# Patient Record
Sex: FEMALE
[De-identification: names, ages, dates, MRNs, and addresses within clinical notes are randomized per-mention and may not be internally consistent; named-entity substitution may affect disease eponyms.]

---

## 2019-07-23 PROBLEM — Z00.00 ENCOUNTER FOR PREVENTIVE HEALTH EXAMINATION: Status: ACTIVE | Noted: 2019-07-23

## 2019-08-07 ENCOUNTER — APPOINTMENT (OUTPATIENT)
Dept: PHYSICAL MEDICINE AND REHAB | Facility: CLINIC | Age: 64
End: 2019-08-07
Payer: COMMERCIAL

## 2019-08-07 VITALS
HEIGHT: 64 IN | SYSTOLIC BLOOD PRESSURE: 130 MMHG | WEIGHT: 183 LBS | BODY MASS INDEX: 31.24 KG/M2 | HEART RATE: 74 BPM | DIASTOLIC BLOOD PRESSURE: 72 MMHG

## 2019-08-07 DIAGNOSIS — Z86.73 PERSONAL HISTORY OF TRANSIENT ISCHEMIC ATTACK (TIA), AND CEREBRAL INFARCTION W/OUT RESIDUAL DEFICITS: ICD-10-CM

## 2019-08-07 DIAGNOSIS — Z87.09 PERSONAL HISTORY OF OTHER DISEASES OF THE RESPIRATORY SYSTEM: ICD-10-CM

## 2019-08-07 DIAGNOSIS — Z87.891 PERSONAL HISTORY OF NICOTINE DEPENDENCE: ICD-10-CM

## 2019-08-07 DIAGNOSIS — M65.331 TRIGGER FINGER, RIGHT MIDDLE FINGER: ICD-10-CM

## 2019-08-07 DIAGNOSIS — Z86.39 PERSONAL HISTORY OF OTHER ENDOCRINE, NUTRITIONAL AND METABOLIC DISEASE: ICD-10-CM

## 2019-08-07 DIAGNOSIS — M19.049 PRIMARY OSTEOARTHRITIS, UNSPECIFIED HAND: ICD-10-CM

## 2019-08-07 DIAGNOSIS — Z86.79 PERSONAL HISTORY OF OTHER DISEASES OF THE CIRCULATORY SYSTEM: ICD-10-CM

## 2019-08-07 DIAGNOSIS — Z87.39 PERSONAL HISTORY OF OTHER DISEASES OF THE MUSCULOSKELETAL SYSTEM AND CONNECTIVE TISSUE: ICD-10-CM

## 2019-08-07 PROCEDURE — 99244 OFF/OP CNSLTJ NEW/EST MOD 40: CPT

## 2019-08-07 RX ORDER — FOLIC ACID 1 MG/1
1 TABLET ORAL
Refills: 0 | Status: ACTIVE | COMMUNITY

## 2019-08-07 RX ORDER — UBIDECARENONE/VIT E ACET 100MG-5
CAPSULE ORAL
Refills: 0 | Status: ACTIVE | COMMUNITY

## 2019-08-07 RX ORDER — ASPIRIN ENTERIC COATED TABLETS 81 MG 81 MG/1
81 TABLET, DELAYED RELEASE ORAL
Refills: 0 | Status: ACTIVE | COMMUNITY

## 2019-08-07 RX ORDER — CYANOCOBALAMIN (VITAMIN B-12) 100 MCG
TABLET ORAL
Refills: 0 | Status: ACTIVE | COMMUNITY

## 2019-08-07 RX ORDER — ALBUTEROL SULFATE 90 UG/1
108 (90 BASE) INHALANT RESPIRATORY (INHALATION)
Refills: 0 | Status: ACTIVE | COMMUNITY

## 2019-08-07 RX ORDER — METFORMIN HYDROCHLORIDE 1000 MG/1
1000 TABLET, COATED ORAL
Refills: 0 | Status: ACTIVE | COMMUNITY

## 2019-08-07 RX ORDER — CHOLECALCIFEROL (VITAMIN D3) 125 MCG
CAPSULE ORAL
Refills: 0 | Status: ACTIVE | COMMUNITY

## 2019-08-07 NOTE — REVIEW OF SYSTEMS
[Joint Pain] : joint pain [Joint Stiffness] : joint stiffness [Negative] : Heme/Lymph [FreeTextEntry9] : r hand -she is r handed

## 2019-08-07 NOTE — PHYSICAL EXAM
[FreeTextEntry1] : PHYSICAL EXAM : OBJECTIVE \par \par GENERAL : Awake ,alert and oriented to time place and person \par HEAD : normocephalic and atraumatic \par NECK : supple ,no tracheal deviation ,no thyroid enlargement noted with swallowing\par EYES : sclera and conjunctiva normal no redness,intact extraocular movements \par ENT  : ears and nose normal in appearance -hearing adequate \par PULMONARY: effort normal. No respiratory distress. breathing regular. No wheezes \par LYMPH : No swelling in limbs, capillary return within normal range \par CVS : warm extremities,no palpitations,not short of breath, no visible jugular venous distention\par PSYCH : mood and affect normal ,good eye contact ,normal attention \par ABDOMEN : no visible distension , \par NEUROLOGICAL:cranial nerves intact,muscle tone normal,gait and balance safe except where noted below \par SKIN : warm and dry No rash detected over specific body areas examined \par MUSCULOSKELETAL: normal muscle bulk, no focal bony tenderness /posture normal except where specified below\par  R 3 rd digit + triggering \par tender at base \par R thumb enlarged MCP \par hebedens node index ++ \par grasp 5/5\par NVI

## 2019-08-07 NOTE — ASSESSMENT
[FreeTextEntry1] : \par PLAN AND RECOMMENDATIONS :\par \par We discussed differential diagnosis and clinical impression\par \par Recommend\par .symptomatic care and support\par  medications OTC \par  imaging -done \par  referral to PT \par  hydrotherapy /heat / cold for pain\par  \par  relative rest and avoidance of painful activity where possible \par  increasing ergonomic precautions  \par \par  return for follow up\par if no better can do injection \par \par explained the arthritic deformities in her hands not fixable \par she does not like the look of her hand joints \par given hand out

## 2019-08-07 NOTE — CONSULT LETTER
[FreeTextEntry1] : Dear Dr. LAI SIMMS  , \par \par I had the pleasure of evaluating your patient, KURT GARCIA .\par \par Thank you very much for allowing me to participate in the care of this patient. If you have any questions, please do not hesitate to contact me. \par \par Sincerely, \par Rafael Olsen MD \par \par ABPMR Board Certified in Physical Medicine and Rehabilitation\par Certified Fellow of AANEM (Neuromuscular and Electrodiagnostic Medicine)\par Subspecialty certified in Sports Medicine (ABPMR)\par \par  of Physical Medicine and Rehabilitation\par [facility] [employer] [facility]\par [facility]\par \par

## 2019-08-07 NOTE — HISTORY OF PRESENT ILLNESS
[FreeTextEntry1] : Ms. KURT GARCIA is a very pleasant 64 year female who comes in for evaluation of R trigger finger that has been ongoing for a few weeks  without any specific injury or inciting event. The pain is located primarily base of 3 rd finger intermittent in nature and described as "jamming " . NO  pain -just annoying -  The patient's symptoms are aggravated by typing  and alleviated by not using . The patient denies any night pain, numbness/tingling, weakness, or bowel/bladder dysfunction. 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

## 2022-08-19 ENCOUNTER — APPOINTMENT (OUTPATIENT)
Dept: NEUROLOGY | Facility: CLINIC | Age: 67
End: 2022-08-19

## 2022-08-23 PROBLEM — Z00.00 ENCOUNTER FOR PREVENTIVE HEALTH EXAMINATION: Status: ACTIVE | Noted: 2022-08-23

## 2022-08-26 ENCOUNTER — APPOINTMENT (OUTPATIENT)
Dept: NEUROLOGY | Facility: CLINIC | Age: 67
End: 2022-08-26

## 2022-08-26 VITALS
OXYGEN SATURATION: 97 % | HEIGHT: 66 IN | SYSTOLIC BLOOD PRESSURE: 109 MMHG | DIASTOLIC BLOOD PRESSURE: 73 MMHG | WEIGHT: 170 LBS | BODY MASS INDEX: 27.32 KG/M2 | HEART RATE: 86 BPM

## 2022-08-26 DIAGNOSIS — I63.9 CEREBRAL INFARCTION, UNSPECIFIED: ICD-10-CM

## 2022-08-26 PROCEDURE — 99203 OFFICE O/P NEW LOW 30 MIN: CPT

## 2025-04-28 ENCOUNTER — INPATIENT (INPATIENT)
Facility: HOSPITAL | Age: 70
LOS: 2 days | Discharge: HOME HEALTH | DRG: 176 | End: 2025-05-01
Attending: STUDENT IN AN ORGANIZED HEALTH CARE EDUCATION/TRAINING PROGRAM | Admitting: STUDENT IN AN ORGANIZED HEALTH CARE EDUCATION/TRAINING PROGRAM
Payer: MEDICARE

## 2025-04-28 VITALS
SYSTOLIC BLOOD PRESSURE: 95 MMHG | RESPIRATION RATE: 16 BRPM | TEMPERATURE: 98 F | WEIGHT: 154.1 LBS | HEART RATE: 88 BPM | DIASTOLIC BLOOD PRESSURE: 63 MMHG | OXYGEN SATURATION: 95 %

## 2025-04-28 DIAGNOSIS — I26.99 OTHER PULMONARY EMBOLISM WITHOUT ACUTE COR PULMONALE: ICD-10-CM

## 2025-04-28 LAB
ALBUMIN SERPL ELPH-MCNC: 4.5 G/DL — SIGNIFICANT CHANGE UP (ref 3.5–5.2)
ALP SERPL-CCNC: 123 U/L — HIGH (ref 30–115)
ALT FLD-CCNC: 12 U/L — SIGNIFICANT CHANGE UP (ref 0–41)
ANION GAP SERPL CALC-SCNC: 11 MMOL/L — SIGNIFICANT CHANGE UP (ref 7–14)
AST SERPL-CCNC: 35 U/L — SIGNIFICANT CHANGE UP (ref 0–41)
BASE EXCESS BLDV CALC-SCNC: -0.1 MMOL/L — SIGNIFICANT CHANGE UP (ref -2–3)
BASOPHILS # BLD AUTO: 0.04 K/UL — SIGNIFICANT CHANGE UP (ref 0–0.2)
BASOPHILS NFR BLD AUTO: 0.4 % — SIGNIFICANT CHANGE UP (ref 0–1)
BILIRUB SERPL-MCNC: 1 MG/DL — SIGNIFICANT CHANGE UP (ref 0.2–1.2)
BUN SERPL-MCNC: 20 MG/DL — SIGNIFICANT CHANGE UP (ref 10–20)
CA-I SERPL-SCNC: 1.33 MMOL/L — SIGNIFICANT CHANGE UP (ref 1.15–1.33)
CALCIUM SERPL-MCNC: 10 MG/DL — SIGNIFICANT CHANGE UP (ref 8.4–10.5)
CHLORIDE SERPL-SCNC: 102 MMOL/L — SIGNIFICANT CHANGE UP (ref 98–110)
CO2 SERPL-SCNC: 25 MMOL/L — SIGNIFICANT CHANGE UP (ref 17–32)
CREAT SERPL-MCNC: 1.1 MG/DL — SIGNIFICANT CHANGE UP (ref 0.7–1.5)
EGFR: 54 ML/MIN/1.73M2 — LOW
EGFR: 54 ML/MIN/1.73M2 — LOW
EOSINOPHIL # BLD AUTO: 0.09 K/UL — SIGNIFICANT CHANGE UP (ref 0–0.7)
EOSINOPHIL NFR BLD AUTO: 0.9 % — SIGNIFICANT CHANGE UP (ref 0–8)
GAS PNL BLDV: 134 MMOL/L — LOW (ref 136–145)
GAS PNL BLDV: SIGNIFICANT CHANGE UP
GLUCOSE SERPL-MCNC: 168 MG/DL — HIGH (ref 70–99)
HCO3 BLDV-SCNC: 27 MMOL/L — SIGNIFICANT CHANGE UP (ref 22–29)
HCT VFR BLD CALC: 34.5 % — LOW (ref 37–47)
HCT VFR BLDA CALC: 51 % — HIGH (ref 34.5–46.5)
HGB BLD CALC-MCNC: 16.9 G/DL — HIGH (ref 11.7–16.1)
HGB BLD-MCNC: 11.7 G/DL — LOW (ref 12–16)
IMM GRANULOCYTES NFR BLD AUTO: 0.5 % — HIGH (ref 0.1–0.3)
LACTATE BLDV-MCNC: 1.3 MMOL/L — SIGNIFICANT CHANGE UP (ref 0.5–2)
LIDOCAIN IGE QN: 34 U/L — SIGNIFICANT CHANGE UP (ref 7–60)
LYMPHOCYTES # BLD AUTO: 1.64 K/UL — SIGNIFICANT CHANGE UP (ref 1.2–3.4)
LYMPHOCYTES # BLD AUTO: 16 % — LOW (ref 20.5–51.1)
MAGNESIUM SERPL-MCNC: 2 MG/DL — SIGNIFICANT CHANGE UP (ref 1.8–2.4)
MCHC RBC-ENTMCNC: 33 PG — HIGH (ref 27–31)
MCHC RBC-ENTMCNC: 33.9 G/DL — SIGNIFICANT CHANGE UP (ref 32–37)
MCV RBC AUTO: 97.2 FL — SIGNIFICANT CHANGE UP (ref 81–99)
MONOCYTES # BLD AUTO: 0.49 K/UL — SIGNIFICANT CHANGE UP (ref 0.1–0.6)
MONOCYTES NFR BLD AUTO: 4.8 % — SIGNIFICANT CHANGE UP (ref 1.7–9.3)
NEUTROPHILS # BLD AUTO: 7.96 K/UL — HIGH (ref 1.4–6.5)
NEUTROPHILS NFR BLD AUTO: 77.4 % — HIGH (ref 42.2–75.2)
NRBC BLD AUTO-RTO: 0 /100 WBCS — SIGNIFICANT CHANGE UP (ref 0–0)
NT-PROBNP SERPL-SCNC: <36 PG/ML — SIGNIFICANT CHANGE UP (ref 0–300)
PCO2 BLDV: 51 MMHG — HIGH (ref 39–42)
PH BLDV: 7.33 — SIGNIFICANT CHANGE UP (ref 7.32–7.43)
PLATELET # BLD AUTO: 226 K/UL — SIGNIFICANT CHANGE UP (ref 130–400)
PMV BLD: 12.2 FL — HIGH (ref 7.4–10.4)
PO2 BLDV: 21 MMHG — LOW (ref 25–45)
POTASSIUM BLDV-SCNC: 3.7 MMOL/L — SIGNIFICANT CHANGE UP (ref 3.5–5.1)
POTASSIUM SERPL-MCNC: 5.1 MMOL/L — HIGH (ref 3.5–5)
POTASSIUM SERPL-SCNC: 5.1 MMOL/L — HIGH (ref 3.5–5)
PROT SERPL-MCNC: 8.8 G/DL — HIGH (ref 6–8)
RBC # BLD: 3.55 M/UL — LOW (ref 4.2–5.4)
RBC # FLD: 13 % — SIGNIFICANT CHANGE UP (ref 11.5–14.5)
SAO2 % BLDV: 21 % — LOW (ref 67–88)
SODIUM SERPL-SCNC: 138 MMOL/L — SIGNIFICANT CHANGE UP (ref 135–146)
TROPONIN T, HIGH SENSITIVITY RESULT: 13 NG/L — SIGNIFICANT CHANGE UP (ref 6–13)
TROPONIN T, HIGH SENSITIVITY RESULT: 15 NG/L — HIGH (ref 6–13)
WBC # BLD: 10.27 K/UL — SIGNIFICANT CHANGE UP (ref 4.8–10.8)
WBC # FLD AUTO: 10.27 K/UL — SIGNIFICANT CHANGE UP (ref 4.8–10.8)

## 2025-04-28 PROCEDURE — 74177 CT ABD & PELVIS W/CONTRAST: CPT | Mod: 26

## 2025-04-28 PROCEDURE — 71045 X-RAY EXAM CHEST 1 VIEW: CPT | Mod: 26

## 2025-04-28 PROCEDURE — 87640 STAPH A DNA AMP PROBE: CPT

## 2025-04-28 PROCEDURE — 71275 CT ANGIOGRAPHY CHEST: CPT | Mod: 26

## 2025-04-28 PROCEDURE — 97162 PT EVAL MOD COMPLEX 30 MIN: CPT | Mod: GP

## 2025-04-28 PROCEDURE — 99285 EMERGENCY DEPT VISIT HI MDM: CPT

## 2025-04-28 PROCEDURE — 83735 ASSAY OF MAGNESIUM: CPT

## 2025-04-28 PROCEDURE — 80048 BASIC METABOLIC PNL TOTAL CA: CPT

## 2025-04-28 PROCEDURE — 36415 COLL VENOUS BLD VENIPUNCTURE: CPT

## 2025-04-28 PROCEDURE — 82962 GLUCOSE BLOOD TEST: CPT

## 2025-04-28 PROCEDURE — 87641 MR-STAPH DNA AMP PROBE: CPT

## 2025-04-28 PROCEDURE — 70450 CT HEAD/BRAIN W/O DYE: CPT | Mod: 26

## 2025-04-28 PROCEDURE — 85027 COMPLETE CBC AUTOMATED: CPT

## 2025-04-28 PROCEDURE — 93970 EXTREMITY STUDY: CPT

## 2025-04-28 PROCEDURE — 83036 HEMOGLOBIN GLYCOSYLATED A1C: CPT

## 2025-04-28 PROCEDURE — 99223 1ST HOSP IP/OBS HIGH 75: CPT

## 2025-04-28 PROCEDURE — 93306 TTE W/DOPPLER COMPLETE: CPT

## 2025-04-28 RX ORDER — ENOXAPARIN SODIUM 100 MG/ML
70 INJECTION SUBCUTANEOUS ONCE
Refills: 0 | Status: COMPLETED | OUTPATIENT
Start: 2025-04-28 | End: 2025-04-28

## 2025-04-28 RX ADMIN — Medication 1000 MILLILITER(S): at 16:30

## 2025-04-28 RX ADMIN — Medication 5 MILLIGRAM(S): at 22:28

## 2025-04-28 RX ADMIN — ENOXAPARIN SODIUM 70 MILLIGRAM(S): 100 INJECTION SUBCUTANEOUS at 21:16

## 2025-04-28 NOTE — ED ADULT TRIAGE NOTE - NSWEIGHTCALCTOOLDRUG_GEN_A_CORE
Bassam from [facility] was calling to speak with a nurse for Dr Hernandez regarding if a device/device rep question for patients upcoming surgical procedure. PSR transferred called to ASHLEY Solorio.    used

## 2025-04-28 NOTE — ED ADULT NURSE NOTE - OBJECTIVE STATEMENT
pt bibems from syncopal episode while visiting family at nursing home.  in triage. Pt has hx of dementia

## 2025-04-28 NOTE — ED ADULT NURSE REASSESSMENT NOTE - NS ED NURSE REASSESS COMMENT FT1
pt is alert and awake speaking in full sentences. pt refuse to be on cardiac monitor/o2 monitor. denies chest pain/SOB, IV intact, vital stable, no s/s of distress.

## 2025-04-28 NOTE — ED PROVIDER NOTE - CLINICAL SUMMARY MEDICAL DECISION MAKING FREE TEXT BOX
70year-old female with history of CVA, IDDM, HIV, dementia, presents with daughter with concern for possible syncopal episode. States she got out of the car and was walking toward the nursing home to visit her  when she suddenly seemed to freeze while walking, when her daughter came over to her she was staring off, when asked what was wrong she said I don't know, then appeared to be stumbling a little. Then when she was upstairs sitting down visiting with her  she suddenly began shaking with one arm extended, sweating, eyes staring open, lasted for about 2 minutes. Daughter also states she has been holding her abdomen for the past few weeks, and this was never addressed. She was admitted last week for similar episode and the other hospital was focusing on her heart, including a stress test that was done and that was negative. States did not have any seizure workup. No history of seizures. Denies fall, fever, vomiting, and all other symptoms. Patient at this time denies all symptoms. On exam, afebrile, hemodynamically stable, saturating well on room air, NAD, well appearing, sitting comfortably in bed, no WOB/tachypnea, speaking full sentences, head NCAT, EOMI, anicteric, MMM, no tongue laceration, RRR, nml S1/S2, no m/r/g, lungs CTAB, no w/r/r, abd soft, mild epigastric TTP, ND, nml BS, no rebound or guarding, AAO to name and location, slightly confused, CN's 3-12 intact, motor 5/5 in all extremities, [place] spontaneously, no leg cyanosis or edema, skin warm, well perfused, no rashes or hives. Abdomen benign, with low suspicion for acute intra-abdominal process, or for mesenteric ischemia/dissection, and CT unremarkable. Character low suspicion for ACS and has had recent extensive cardiac workup. Character low suspicion for CVA and no focal or localizing findings to warrant CT angio imaging or neurology consult or stroke admission. No significant arrhythmia or e/o aortic outflow obstruction. No significant anemia or bleeding to warrant blood transfusion at this time, or gross electrolyte abnormalities to warrant repletion or admission. No fever or specific infectious symptoms. Presentation could be consistent with that of a seizure. Neurology consulted and can do EEG if stays as inpt. CT shows PE with concern for right heart strain. Clinically no signs of right heart strain. Discussed with critical care fellow admitted to the SDU. Patient well appearing, hemodynamically stable. Given Lovenox. Admitted for further monitoring, w/u, and care.

## 2025-04-28 NOTE — ED PROVIDER NOTE - CARE PLAN
1 Principal Discharge DX:	Syncope   Principal Discharge DX:	Pulmonary embolism  Secondary Diagnosis:	Syncope

## 2025-04-28 NOTE — CONSULT NOTE ADULT - ASSESSMENT
9 y/o F with a PMHx of Stroke in 2020 (residual mild expressive aphasia, and dysarthria), HLD, HIV (on Biktarvy), DM, distant hx of open heart surgery?, and recently discharged from [facility] for the workup of intermittent chest pain with EKG changes, was brought to the ED by her daughter in law after an episode transient impaired awareness. Per daughter the patient appeared to be talking while she was having the shaking episode was very diaphoretic, with no clear post ictal state, making seizure unlikely. However given her hx of HIV and stroke, seizures can't be completely ruled out. While her orthostatics are nominally negative, her BP is very soft 81/69 standing. Syncopal etiology favored this time.    Recommendations:  - CTH non-con  - Routine labs  - Can consider routine EEG if patient willing   - Would start ASA 81mg given hx of stroke.   - Please call us back if further concerns arise when initial workup completed     Discussed with attending, Dr. Gloria

## 2025-04-28 NOTE — ED ADULT NURSE NOTE - SUICIDE SCREENING QUESTION 1
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 Discussed pathology results with the patient and her mother over the phone. Will arrange for surgical oncology consultation.     Leena Brunson PA-C    No

## 2025-04-28 NOTE — ED ADULT NURSE NOTE - NSFALLHARMRISKINTERV_ED_ALL_ED
Assistance OOB with selected safe patient handling equipment if applicable/Assistance with ambulation/Communicate risk of Fall with Harm to all staff, patient, and family/Monitor gait and stability/Orthostatic vital signs/Provide visual cue: red socks, yellow wristband, yellow gown, etc/Reinforce activity limits and safety measures with patient and family/Bed in lowest position, wheels locked, appropriate side rails in place/Call bell, personal items and telephone in reach/Instruct patient to call for assistance before getting out of bed/chair/stretcher/Non-slip footwear applied when patient is off stretcher/[place] to call system/Physically safe environment - no spills, clutter or unnecessary equipment/Purposeful Proactive Rounding/Room/bathroom lighting operational, light cord in reach

## 2025-04-28 NOTE — H&P ADULT - HISTORY OF PRESENT ILLNESS
Patient is a 70 year old F with PMHx CVA (residual mild expressive aphasia, and dysarthria), HTN, HLD, Type II DM, and HIV on Biktarvy, who presented to the ED from assisted living facility after she was thought to have a syncopal episode vs. seizure as described by a family member. Denies any fevers, chills, current chest pain, shortness of breath, or palpitations.     Vitals in ED: T 97.6, HR 88, BP 85/58 (orthostatics performed, negative), RR 16, sat 95% on RA  Labs in ED: Trop 15 --> 13, Pro-BNP <36, VBG 7.33/51/21/27  Imaging:  CT Angio Chest w/ PE Protocol: Right lobar/segmental pulmonary emboli. Right heart strain is noted.       Patient is a 70 year old F with PMHx CVA (residual mild expressive aphasia, and dysarthria), HTN, HLD, Type II DM, and HIV on Biktarvy, who presented to the ED with her daughter-in-law from an assisted living facility after she was thought to have a syncopal episode vs. seizure. As per daughter in law, patient appeared to be talking when she began shaking; she was also very diaphoretic, no clear post ictal state, Denies any fevers, chills, current chest pain, shortness of breath, or palpitations.     Seen by neuro-- history obtained makes seizure unlikely. However given her hx of HIV and stroke, seizures can't be completely ruled out. Recommended CT Head non con. routine labs, EEG, and starting ASA given hx of stroke.    Vitals in ED: T 97.6, HR 88, BP 85/58 (orthostatics performed, negative), RR 16, sat 95% on RA  Labs in ED: Trop 15 --> 13, Pro-BNP <36, VBG 7.33/51/21/27  EKG:  Imaging:  CT Head: No evidence of acute intracranial abnormality. Chronic ischemic change.  CT Abd/Pelvis w/ IV contrast: There is no definite pathology within the abdomen but please note there is large amount of artifact particularly within the left lower quadrant which limits evaluation. Filling defects noted in right middle/lower lobes.  CT Angio Chest w/ PE Protocol: Right lobar/segmental pulmonary emboli. Right heart strain is noted.  Interventions:  - NS 1L bolus x1, and Lovenox 70 mg subcutaneous x1. Admitted to SDU.      Patient is a 70 year old F with PMHx CVA (residual mild expressive aphasia, and dysarthria), HTN, HLD, Type II DM, and HIV on Biktarvy, who presented to the ED with her daughter-in-law from an assisted living facility (?) after she was thought to have a syncopal episode vs. seizure. As per ED note, patient's daughter in law reported patient appeared to be talking when she began shaking; she was also very diaphoretic, no clear post ictal state. As per ED note, patient denied any fevers, chills, current chest pain, shortness of breath, or palpitations.     Seen by neuro-- history obtained makes seizure unlikely. However given her hx of HIV and stroke, seizures can't be completely ruled out. Recommended CT Head non con. routine labs, EEG, and starting ASA given hx of stroke.    OF NOTE: on my exam, patient is uncooperative. Patient's  is also being admitted with a bed in the same room, and would interject my conversation with the patient by yelling "don't tell her anything" and "she is the police." Patient would then stop talking mid-sentence and refuse to answer my questions, asking me "why I need to know." Despite explaining at length that I am a resident physician who is asking questions in regards to her medical history, she was convinced by her  not to speak further and that she "does not care" if not speaking will negatively affect her hospital admission and long term outcomes.     Vitals in ED: T 97.6, HR 88, BP 85/58 (orthostatics performed, negative), RR 16, sat 95% on RA  Labs in ED: Trop 15 --> 13, Pro-BNP <36, VBG 7.33/51/21/27  EKG:  Imaging:  CT Head: No evidence of acute intracranial abnormality. Chronic ischemic change.  CT Abd/Pelvis w/ IV contrast: There is no definite pathology within the abdomen but please note there is large amount of artifact particularly within the left lower quadrant which limits evaluation. Filling defects noted in right middle/lower lobes.  CT Angio Chest w/ PE Protocol: Right lobar/segmental pulmonary emboli. Right heart strain is noted.  Interventions:  - NS 1L bolus x1, and Lovenox 70 mg subcutaneous x1. Admitted to SDU.

## 2025-04-28 NOTE — ED ADULT NURSE NOTE - ISOLATION TYPE:
Conveyed results to patient. Answered any questions or concerns. Patient verbalized understanding.    Patient confirms taking Levothyroxine correctly.  Refill requested, enough sent to make it to next lab date.    Lab orders placed.   None

## 2025-04-28 NOTE — H&P ADULT - ASSESSMENT
**THIS NOTE IS INCOMPLETE**      #Right lobar/segmental pulmonary emboli with Right heart strain        #Syncopal Episode vs Seizure   - as per ED note, patient was thought to have a syncopal episode vs seizure  - seen by neuro --     #HTN  - was recently seen by Dr. Guadalupe at [facility] who determined losartan should be decreased to 50 mg BID (was 100 mg BID)  - was found to have hyperdynamic left ventricle, for which Dr. Guadalupe recommended metoprolol succinate 25 mg once a day in the morning     #Type II DM - uncontrolled   - as per documentation from [facility] admission, HbA1c 14  - repeat A1c  - monitor glucose, ISS    #HLD  - c/w atorvastatin 40 mg qhs    #H/O Cardiac Surgery with midline sternotomy scar   - Details unknown     #HIV   - needs to be addressed with ID.     MISC  #DVT prophylaxis: Lovenox BID  #GI prophylaxis: not indicated  #Diet: DASH, CC  #Activity: IAT  #Code status: Full Code  #Disposition: Admit to SDU   **THIS NOTE IS INCOMPLETE**    Patient is a 70 year old F with PMHx CVA (residual mild expressive aphasia, and dysarthria), HTN, HLD, Type II DM, and HIV on Biktarvy, who presented to the ED with her daughter-in-law from an assisted living facility after she was thought to have a syncopal episode vs. seizure. As per daughter in law, patient appeared to be talking when she began shaking; she was also very diaphoretic, no clear post ictal state, Denies any fevers, chills, current chest pain, shortness of breath, or palpitations. Admitted to SDU.     #Right lobar/segmental pulmonary emboli with Right heart strain  - presented after syncopal episode vs seizure  - VS on admission significant for soft BP 85/58,  orthostatics performed (-)  - Trop 15 --> 13, Pro-BNP <36  - CT Angio Chest w/ PE Protocol: Right lobar/segmental pulmonary emboli. Right heart strain is noted.  PLAN:  - s/p NS 1L bolus x1, and Lovenox 70 mg subcutaneous x1 in ED  - c/w Lovenox 70 mg BID  - LR @ 50 ccs/hr for gentle hydration  - TTE  - f/u AM trop and routine labs  - venous duplex lower extremities    #Syncopal Episode vs Seizure   - as per ED note, patient appeared to be talking when she began shaking; she was also very diaphoretic, no clear post ictal state,  - for syncope workup-- orthostatics negative, obtain TTE, PT consult, ambulate with assistance, fall precautions  - seen by neuro for possible seizure, recs as below:  --CT Head non con --> unremarkable, shows chronic ischemic change  --EEG   --starting ASA given hx of stroke    #HTN  - was recently seen by Dr. Guadalupe at [facility] who determined losartan should be decreased to 50 mg BID (was 100 mg BID)  - was found to have hyperdynamic left ventricle, for which Dr. Guadalupe recommended metoprolol succinate 25 mg once a day in the morning   - holding all BP meds for now in setting of hypotension resume as patient tolerates    #Type II DM - uncontrolled   - as per documentation from [facility] admission, HbA1c 14  - repeat A1c  - monitor glucose, ISS    #HLD  - c/w atorvastatin 40 mg qhs  - f/u lipid profile    #H/O Cardiac Surgery with midline sternotomy scar   - Details unknown     #HIV on Biktarvy  - needs to be addressed with ID    MISC  #DVT prophylaxis: Lovenox BID  #GI prophylaxis: not indicated  #Diet: DASH, CC  #Activity: IAT  #Code status: Full Code  #Disposition: Admit to SDU   **THIS NOTE IS INCOMPLETE**    Patient is a 70 year old F with PMHx CVA (residual mild expressive aphasia, and dysarthria), HTN, HLD, Type II DM, and HIV on Biktarvy, who presented to the ED with her daughter-in-law from an assisted living facility after she was thought to have a syncopal episode vs. seizure. Found to have right lobar/segmental PE with right heart strain, admitted to SDU.     #Right lobar/segmental pulmonary emboli with Right heart strain  - presented after syncopal episode vs seizure  - VS on admission significant for soft BP 85/58,  orthostatics performed (-)  - Trop 15 --> 13, Pro-BNP <36  - CT Angio Chest w/ PE Protocol: Right lobar/segmental pulmonary emboli. Right heart strain is noted.  PLAN:  - s/p NS 1L bolus x1, and Lovenox 70 mg subcutaneous x1 in ED  - c/w Lovenox 70 mg BID  - LR @ 50 ccs/hr for gentle hydration  - TTE  - f/u AM trop and routine labs  - venous duplex lower extremities    #Syncopal Episode vs Seizure   - as per ED note, patient appeared to be talking when she began shaking; she was also very diaphoretic, no clear post ictal state,  - for syncope workup-- orthostatics negative, obtain TTE, PT consult, ambulate with assistance, fall precautions  - seen by neuro for possible seizure, recs as below:  --CT Head non con --> unremarkable, shows chronic ischemic change  --EEG   --starting ASA given hx of stroke    #HTN  - was recently seen by Dr. Guadalupe at [facility] who determined losartan should be decreased to 50 mg BID (was 100 mg BID)  - was found to have hyperdynamic left ventricle, for which Dr. Guadalupe recommended metoprolol succinate 25 mg once a day in the morning   - holding all BP meds for now in setting of hypotension resume as patient tolerates    #Type II DM - uncontrolled   - as per documentation from [facility] admission, HbA1c 14  - repeat A1c  - monitor glucose, ISS    #HLD  - c/w atorvastatin 40 mg qhs  - f/u lipid profile    #H/O Cardiac Surgery with midline sternotomy scar   - Details unknown     #HIV on Biktarvy  - needs to be addressed with ID    MISC  #DVT prophylaxis: Lovenox BID  #GI prophylaxis: not indicated  #Diet: DASH, CC  #Activity: IAT  #Code status: Full Code  #Disposition: Admit to SDU   Patient is a 70 year old F with PMHx CVA (residual mild expressive aphasia, and dysarthria), HTN, HLD, Type II DM, and HIV on Biktarvy, who presented to the ED with her daughter-in-law from an assisted living facility after she was thought to have a syncopal episode vs. seizure. Found to have right lobar/segmental PE with right heart strain, admitted to SDU.     **PLEASE COMPLETE MED REC IN AM, patient refused to discuss medications or her pharmacy during admission, thank you so much**    #Right lobar/segmental pulmonary emboli with Right heart strain  - presented after syncopal episode vs seizure  - VS on admission significant for soft BP 85/58,  orthostatics performed (-)  - Trop 15 --> 13, Pro-BNP <36  - CT Angio Chest w/ PE Protocol: Right lobar/segmental pulmonary emboli. Right heart strain is noted.  PLAN:  - s/p NS 1L bolus x1, and Lovenox 70 mg subcutaneous x1 in ED  - c/w Lovenox 70 mg BID  - LR @ 50 ccs/hr for gentle hydration  - TTE  - f/u AM trop and routine labs  - venous duplex lower extremities  - Hypercoagulable workup ordered as history cannot be properly obtained at time of admission-- Protein C, S, Factor V Leiden, Homocysteine, Beta 2 glycoprotein, Anticardiolipin Ab    #Syncopal Episode vs Seizure   - as per ED note, patient appeared to be talking when she began shaking; she was also very diaphoretic, no clear post ictal state,  - for syncope workup-- orthostatics negative, obtain TTE, PT consult, ambulate with assistance, fall precautions  - seen by neuro for possible seizure, recs as below:  --CT Head non con --> unremarkable, shows chronic ischemic change  --EEG   --starting ASA given hx of stroke    #HTN  - from [facility]:  --was recently seen by Dr. Guadalupe at [facility] who determined losartan should be decreased to 50 mg BID (was 100 mg BID)  - was found to have hyperdynamic left ventricle, for which Dr. Guadalupe recommended metoprolol succinate 25 mg once a day in the morning   - holding all BP meds for now in setting of hypotension, resume as patient tolerates    #Type II DM - uncontrolled   - from [facility]:  - as per documentation from [facility] admission, HbA1c 14  - repeat A1c  - monitor glucose, ISS, increase insulin requirements as appropriate    #HLD  - from [facility]:  - c/w atorvastatin 40 mg qhs  - f/u lipid profile    #H/O Cardiac Surgery with midline sternotomy scar   - from [facility]:  - Details unknown     #HIV on Biktarvy  - unknown if patient follows with ID  - from [facility]:  - c/w Biktarvy    MISC  #DVT prophylaxis: Lovenox BID  #GI prophylaxis: not indicated  #Diet: DASH, CC  #Activity: IAT  #Code status: Full Code  #Disposition: Admit to SDU

## 2025-04-28 NOTE — ED PROVIDER NOTE - OBJECTIVE STATEMENT
pt presents to ED for eval after syncopal episode ?seizure as described by family member who witnessed it. of note, pt admitted recently for CP and was supposed to have BP meds adjusted per cardio notes, but pt has been taking meds as she was originally prescribed. family member also concerned for belly pain she had at that time that went unevaluated given more pressing issue of CP. Denies fever/chill/HA/dizziness/chest pain/palpitation/sob/n/v/d/ black stool/bloody stool/urinary sxs

## 2025-04-28 NOTE — H&P ADULT - NSHPPHYSICALEXAM_GEN_ALL_CORE
GENERAL: NAD, lying in bed comfortably  HEAD:  Atraumatic, normocephalic  NECK: Supple, no JVD  HEART: Regular rate and rhythm, no murmurs, rubs, or gallops  LUNGS: Unlabored respirations.  Clear to auscultation bilaterally, no crackles, wheezing, or rhonchi  ABDOMEN: Soft, nontender, nondistended, +BS  EXTREMITIES: 2+ peripheral pulses bilaterally. No clubbing, cyanosis, or edema  NERVOUS SYSTEM:  A&Ox3, no focal deficits   SKIN: No rashes or lesions Patient refused physical exam.

## 2025-04-28 NOTE — ED PROVIDER NOTE - ATTENDING APP SHARED VISIT CONTRIBUTION OF CARE
70year-old female with history of CVA, IDDM, HIV, dementia, presents with daughter with concern for possible syncopal episode. States she got out of the car and was walking toward the nursing home to visit her  when she suddenly seemed to freeze while walking, when her daughter came over to her she was staring off, when asked what was wrong she said I don't know, then appeared to be stumbling a little. Then when she was upstairs sitting down visiting with her  she suddenly began shaking with one arm extended, sweating, eyes staring open, lasted for about 2 minutes. Daughter also states she has been holding her abdomen for the past few weeks, and this was never addressed. She was admitted last week for similar episode and the other hospital was focusing on her heart, including a stress test that was done and that was negative. States did not have any seizure workup. No history of seizures. Denies fall, fever, vomiting, and all other symptoms. Patient at this time denies all symptoms. On exam, afebrile, hemodynamically stable, saturating well on room air, NAD, well appearing, sitting comfortably in bed, no WOB/tachypnea, speaking full sentences, head NCAT, EOMI, anicteric, MMM, no tongue laceration, RRR, nml S1/S2, no m/r/g, lungs CTAB, no w/r/r, abd soft, mild epigastric TTP, ND, nml BS, no rebound or guarding, AAO to name and location, slightly confused, CN's 3-12 intact, motor 5/5 in all extremities, MOJICA spontaneously, no leg cyanosis or edema, skin warm, well perfused, no rashes or hives. Abdomen benign, with low suspicion for acute intra-abdominal process, or for mesenteric ischemia/dissection, and CT __________. Character low suspicion for ACS and has had recent extensive cardiac workup. Character low suspicion for CVA and no focal or localizing findings to warrant CT angio imaging or neurology consult or stroke admission. No significant arrhythmia or e/o aortic outflow obstruction. No significant anemia or bleeding to warrant blood transfusion at this time, or gross electrolyte abnormalities to warrant repletion or admission. No CP/SOB to suggest ACS or e/o DVT to suggest PE to warrant admission or further work-up in this regard. No fever or specific infectious symptoms. Presentation could be consistent with that of a seizure. Neurology consulted and 70year-old female with history of CVA, IDDM, HIV, dementia, presents with daughter with concern for possible syncopal episode. States she got out of the car and was walking toward the nursing home to visit her  when she suddenly seemed to freeze while walking, when her daughter came over to her she was staring off, when asked what was wrong she said I don't know, then appeared to be stumbling a little. Then when she was upstairs sitting down visiting with her  she suddenly began shaking with one arm extended, sweating, eyes staring open, lasted for about 2 minutes. Daughter also states she has been holding her abdomen for the past few weeks, and this was never addressed. She was admitted last week for similar episode and the other hospital was focusing on her heart, including a stress test that was done and that was negative. States did not have any seizure workup. No history of seizures. Denies fall, fever, vomiting, and all other symptoms. Patient at this time denies all symptoms. On exam, afebrile, hemodynamically stable, saturating well on room air, NAD, well appearing, sitting comfortably in bed, no WOB/tachypnea, speaking full sentences, head NCAT, EOMI, anicteric, MMM, no tongue laceration, RRR, nml S1/S2, no m/r/g, lungs CTAB, no w/r/r, abd soft, mild epigastric TTP, ND, nml BS, no rebound or guarding, AAO to name and location, slightly confused, CN's 3-12 intact, motor 5/5 in all extremities, MOJICA spontaneously, no leg cyanosis or edema, skin warm, well perfused, no rashes or hives. Abdomen benign, with low suspicion for acute intra-abdominal process, or for mesenteric ischemia/dissection, and CT unremarkable. Character low suspicion for ACS and has had recent extensive cardiac workup. Character low suspicion for CVA and no focal or localizing findings to warrant CT angio imaging or neurology consult or stroke admission. No significant arrhythmia or e/o aortic outflow obstruction. No significant anemia or bleeding to warrant blood transfusion at this time, or gross electrolyte abnormalities to warrant repletion or admission. No fever or specific infectious symptoms. Presentation could be consistent with that of a seizure. Neurology consulted and can do EEG if stays as inpt. CT shows PE with concern for right heart strain. Clinically no signs of right heart strain. Discussed with critical care fellow admitted to the SDU. Patient well appearing, hemodynamically stable. Given Lovenox. Admitted for further monitoring, w/u, and care.

## 2025-04-28 NOTE — H&P ADULT - ATTENDING COMMENTS
Patient seen and examined at bedside independently of the residents. I read the resident's note and agree with the plan with the additions and corrections as noted below. My note supersedes the resident's note.     REVIEW OF SYSTEMS:  Negative except in HPI.     PMH:  CVA (residual mild expressive aphasia and dysarthria), HTN, HLD, DM II, and HIV (Biktarvy)     FHx: Reviewed. No fhx of asthma/copd, No fhx of liver and pulmonary disease. No fhx of hematological disorder.     Physical Exam:  GEN: No acute distress. Awake, Alert and oriented x 3.   Head: Atraumatic, Normocephalic.   Eye: PEERLA. No sclera icterus. EOMI.   ENT: Normal oropharynx, no thyromegaly, no mass, no lymphadenopathy.   LUNGS: Clear to auscultation bilaterally. No wheeze/rales/crackles.   HEART: Normal. S1/S2 present. RRR. No murmur/gallops.   ABD: Soft, non-tender, non-distended. Bowel sounds present.   EXT: No pitting edema. No erythema. No tenderness.  Integumentary: No rash, No sore, No petechia.   NEURO: CN III-XII intact. Strength: 5/5 b/l ULE. Sensory intact b/l ULE.     Vital Signs Last 24 Hrs  T(C): 36.4 (2025 22:21), Max: 36.4 (2025 15:20)  T(F): 97.5 (2025 22:21), Max: 97.6 (2025 15:20)  HR: 80 (2025 04:47) (80 - 88)  BP: 110/65 (2025 04:47) (95/63 - 110/65)  BP(mean): --  RR: 16 (2025 04:47) (16 - 16)  SpO2: 99% (2025 04:47) (95% - 100%)    Parameters below as of 2025 04:47  Patient On (Oxygen Delivery Method): room air      Please see the above notes for Labs and radiology.     Assessment and Plan:     69 yo F with hx of CVA (residual mild expressive aphasia and dysarthria), HTN, HLD, DM II, and HIV (Biktarvy) presents to ED for syncopal episode, found to have acute PE with possible RHS.     Acute PE with possible RHS   - CTA chest: Right lobar/segmental pulmonary emboli. Right heart strain is noted.  - Lovenox BID   - Troponin 15 -> 13. ProBNP wnl.   - check TTE and Venous duplex LE   - monitor BP closely.   - Admit to SDU.     Syncope likely 2/2 hypotension (r/o seizure)  - CTH: No evidence of acute intracranial abnormality. Chronic ischemic change.  - s/p eval by Neuro in ED.   - check EEG, TTE and carotid duplex   - seizure precaution. aspiration precaution.   - monitor on Telemetry.     HTN/HLD - hold anti-HTN medication as BP borderline in ED.   DM II - monitor FS AC HS. Insulin regimen.   HIV - Biktarvy   Hx of CVA - c/w home med.     DVT ppx: Lovenox SC  GI ppx: PPI  Diet: DASH/CC diet  Activity: as tolerated.     Date seen by the attendin2025  I have spent 75 minutes of time on the encounter which excludes teaching and/or separately reported services.

## 2025-04-29 ENCOUNTER — RESULT REVIEW (OUTPATIENT)
Age: 70
End: 2025-04-29

## 2025-04-29 DIAGNOSIS — I26.99 OTHER PULMONARY EMBOLISM WITHOUT ACUTE COR PULMONALE: ICD-10-CM

## 2025-04-29 DIAGNOSIS — R09.89 OTHER SPECIFIED SYMPTOMS AND SIGNS INVOLVING THE CIRCULATORY AND RESPIRATORY SYSTEMS: ICD-10-CM

## 2025-04-29 LAB
ANION GAP SERPL CALC-SCNC: 11 MMOL/L — SIGNIFICANT CHANGE UP (ref 7–14)
BUN SERPL-MCNC: 12 MG/DL — SIGNIFICANT CHANGE UP (ref 10–20)
CALCIUM SERPL-MCNC: 9.5 MG/DL — SIGNIFICANT CHANGE UP (ref 8.4–10.5)
CHLORIDE SERPL-SCNC: 103 MMOL/L — SIGNIFICANT CHANGE UP (ref 98–110)
CO2 SERPL-SCNC: 24 MMOL/L — SIGNIFICANT CHANGE UP (ref 17–32)
CREAT SERPL-MCNC: 0.8 MG/DL — SIGNIFICANT CHANGE UP (ref 0.7–1.5)
EGFR: 80 ML/MIN/1.73M2 — SIGNIFICANT CHANGE UP
EGFR: 80 ML/MIN/1.73M2 — SIGNIFICANT CHANGE UP
GLUCOSE BLDC GLUCOMTR-MCNC: 119 MG/DL — HIGH (ref 70–99)
GLUCOSE BLDC GLUCOMTR-MCNC: 134 MG/DL — HIGH (ref 70–99)
GLUCOSE BLDC GLUCOMTR-MCNC: 158 MG/DL — HIGH (ref 70–99)
GLUCOSE SERPL-MCNC: 147 MG/DL — HIGH (ref 70–99)
HCT VFR BLD CALC: 31.8 % — LOW (ref 37–47)
HGB BLD-MCNC: 11 G/DL — LOW (ref 12–16)
MCHC RBC-ENTMCNC: 33.1 PG — HIGH (ref 27–31)
MCHC RBC-ENTMCNC: 34.6 G/DL — SIGNIFICANT CHANGE UP (ref 32–37)
MCV RBC AUTO: 95.8 FL — SIGNIFICANT CHANGE UP (ref 81–99)
NRBC BLD AUTO-RTO: 0 /100 WBCS — SIGNIFICANT CHANGE UP (ref 0–0)
PLATELET # BLD AUTO: 237 K/UL — SIGNIFICANT CHANGE UP (ref 130–400)
PMV BLD: 10.6 FL — HIGH (ref 7.4–10.4)
POTASSIUM SERPL-MCNC: 3.4 MMOL/L — LOW (ref 3.5–5)
POTASSIUM SERPL-SCNC: 3.4 MMOL/L — LOW (ref 3.5–5)
RBC # BLD: 3.32 M/UL — LOW (ref 4.2–5.4)
RBC # FLD: 12.7 % — SIGNIFICANT CHANGE UP (ref 11.5–14.5)
SODIUM SERPL-SCNC: 138 MMOL/L — SIGNIFICANT CHANGE UP (ref 135–146)
WBC # BLD: 5.97 K/UL — SIGNIFICANT CHANGE UP (ref 4.8–10.8)
WBC # FLD AUTO: 5.97 K/UL — SIGNIFICANT CHANGE UP (ref 4.8–10.8)

## 2025-04-29 PROCEDURE — 93970 EXTREMITY STUDY: CPT | Mod: 26

## 2025-04-29 PROCEDURE — 99233 SBSQ HOSP IP/OBS HIGH 50: CPT

## 2025-04-29 PROCEDURE — 93306 TTE W/DOPPLER COMPLETE: CPT | Mod: 26

## 2025-04-29 RX ORDER — LOSARTAN POTASSIUM 100 MG/1
1 TABLET, FILM COATED ORAL
Refills: 0 | DISCHARGE

## 2025-04-29 RX ORDER — ATORVASTATIN CALCIUM 80 MG/1
1 TABLET, FILM COATED ORAL
Refills: 0 | DISCHARGE

## 2025-04-29 RX ORDER — METOPROLOL SUCCINATE 50 MG/1
1 TABLET, EXTENDED RELEASE ORAL
Refills: 0 | DISCHARGE

## 2025-04-29 RX ORDER — INSULIN GLARGINE-YFGN 100 [IU]/ML
16 INJECTION, SOLUTION SUBCUTANEOUS AT BEDTIME
Refills: 0 | Status: DISCONTINUED | OUTPATIENT
Start: 2025-04-29 | End: 2025-05-01

## 2025-04-29 RX ORDER — BICTEGRAVIR SODIUM, EMTRICITABINE, AND TENOFOVIR ALAFENAMIDE FUMARATE 50; 200; 25 MG/1; MG/1; MG/1
1 TABLET ORAL DAILY
Refills: 0 | Status: DISCONTINUED | OUTPATIENT
Start: 2025-04-29 | End: 2025-05-01

## 2025-04-29 RX ORDER — DEXTROSE 50 % IN WATER 50 %
15 SYRINGE (ML) INTRAVENOUS ONCE
Refills: 0 | Status: DISCONTINUED | OUTPATIENT
Start: 2025-04-29 | End: 2025-05-01

## 2025-04-29 RX ORDER — INSULIN LISPRO 100 U/ML
5 INJECTION, SOLUTION INTRAVENOUS; SUBCUTANEOUS
Refills: 0 | Status: DISCONTINUED | OUTPATIENT
Start: 2025-04-29 | End: 2025-05-01

## 2025-04-29 RX ORDER — BICTEGRAVIR SODIUM, EMTRICITABINE, AND TENOFOVIR ALAFENAMIDE FUMARATE 50; 200; 25 MG/1; MG/1; MG/1
1 TABLET ORAL
Refills: 0 | DISCHARGE

## 2025-04-29 RX ORDER — DEXTROSE 50 % IN WATER 50 %
25 SYRINGE (ML) INTRAVENOUS ONCE
Refills: 0 | Status: DISCONTINUED | OUTPATIENT
Start: 2025-04-29 | End: 2025-05-01

## 2025-04-29 RX ORDER — QUETIAPINE FUMARATE 25 MG/1
200 TABLET ORAL AT BEDTIME
Refills: 0 | Status: DISCONTINUED | OUTPATIENT
Start: 2025-04-29 | End: 2025-05-01

## 2025-04-29 RX ORDER — HUMAN INSULIN 100 [IU]/ML
5 INJECTION, SUSPENSION SUBCUTANEOUS
Refills: 0 | DISCHARGE

## 2025-04-29 RX ORDER — SODIUM CHLORIDE 9 G/1000ML
1000 INJECTION, SOLUTION INTRAVENOUS
Refills: 0 | Status: DISCONTINUED | OUTPATIENT
Start: 2025-04-29 | End: 2025-05-01

## 2025-04-29 RX ORDER — ASPIRIN 325 MG
81 TABLET ORAL DAILY
Refills: 0 | Status: DISCONTINUED | OUTPATIENT
Start: 2025-04-29 | End: 2025-05-01

## 2025-04-29 RX ORDER — LOSARTAN POTASSIUM 100 MG/1
100 TABLET, FILM COATED ORAL DAILY
Refills: 0 | Status: DISCONTINUED | OUTPATIENT
Start: 2025-04-29 | End: 2025-05-01

## 2025-04-29 RX ORDER — GLUCAGON 3 MG/1
1 POWDER NASAL ONCE
Refills: 0 | Status: DISCONTINUED | OUTPATIENT
Start: 2025-04-29 | End: 2025-05-01

## 2025-04-29 RX ORDER — QUETIAPINE FUMARATE 25 MG/1
1 TABLET ORAL
Refills: 0 | DISCHARGE

## 2025-04-29 RX ORDER — INSULIN GLARGINE-YFGN 100 [IU]/ML
16 INJECTION, SOLUTION SUBCUTANEOUS
Refills: 0 | DISCHARGE

## 2025-04-29 RX ORDER — DEXTROSE 50 % IN WATER 50 %
12.5 SYRINGE (ML) INTRAVENOUS ONCE
Refills: 0 | Status: DISCONTINUED | OUTPATIENT
Start: 2025-04-29 | End: 2025-05-01

## 2025-04-29 RX ORDER — ENOXAPARIN SODIUM 100 MG/ML
70 INJECTION SUBCUTANEOUS EVERY 12 HOURS
Refills: 0 | Status: DISCONTINUED | OUTPATIENT
Start: 2025-04-29 | End: 2025-05-01

## 2025-04-29 RX ORDER — ATORVASTATIN CALCIUM 80 MG/1
40 TABLET, FILM COATED ORAL AT BEDTIME
Refills: 0 | Status: DISCONTINUED | OUTPATIENT
Start: 2025-04-29 | End: 2025-05-01

## 2025-04-29 RX ORDER — INSULIN LISPRO 100 U/ML
INJECTION, SOLUTION INTRAVENOUS; SUBCUTANEOUS
Refills: 0 | Status: DISCONTINUED | OUTPATIENT
Start: 2025-04-29 | End: 2025-05-01

## 2025-04-29 RX ADMIN — LOSARTAN POTASSIUM 100 MILLIGRAM(S): 100 TABLET, FILM COATED ORAL at 21:36

## 2025-04-29 RX ADMIN — Medication 81 MILLIGRAM(S): at 15:31

## 2025-04-29 RX ADMIN — INSULIN LISPRO 2: 100 INJECTION, SOLUTION INTRAVENOUS; SUBCUTANEOUS at 12:04

## 2025-04-29 RX ADMIN — Medication 5 MILLIGRAM(S): at 21:37

## 2025-04-29 RX ADMIN — ENOXAPARIN SODIUM 70 MILLIGRAM(S): 100 INJECTION SUBCUTANEOUS at 05:02

## 2025-04-29 RX ADMIN — ENOXAPARIN SODIUM 70 MILLIGRAM(S): 100 INJECTION SUBCUTANEOUS at 17:50

## 2025-04-29 RX ADMIN — INSULIN GLARGINE-YFGN 16 UNIT(S): 100 INJECTION, SOLUTION SUBCUTANEOUS at 21:35

## 2025-04-29 RX ADMIN — ATORVASTATIN CALCIUM 40 MILLIGRAM(S): 80 TABLET, FILM COATED ORAL at 21:37

## 2025-04-29 RX ADMIN — QUETIAPINE FUMARATE 200 MILLIGRAM(S): 25 TABLET ORAL at 22:30

## 2025-04-29 RX ADMIN — BICTEGRAVIR SODIUM, EMTRICITABINE, AND TENOFOVIR ALAFENAMIDE FUMARATE 1 TABLET(S): 50; 200; 25 TABLET ORAL at 21:36

## 2025-04-29 NOTE — PATIENT PROFILE ADULT - PATIENT'S GENDER IDENTITY
Female
Detail Level: Generalized
Sunscreen Recommendations: I recommend a broad-spectrum sunscreen with an SPF of 30 or higher. SPF 30 sunscreens block approximately 97 percent of the sun's harmful rays. Sunscreens should be applied at least 15 minutes prior to expected sun exposure and then every two hours after that at a minimum (or more frequently if it states in the packaging) as long as sun exposure continues. If swimming or exercising sunscreen should be reapplied every 45 minutes to an hour after getting wet or sweating. One ounce, or the equivalent of a shot glass full of sunscreen, is adequate to protect the skin of the entire body not covered by a bathing suit. I also recommend a lip balm with SPF 30 as well. Mineral-based sunscreens may be tolerated better in patients with sensitive skin and are recommended for anyone with a history of rosacea and/or melasma. Sun-protective clothing (rash guards, sun sleeves, gloves, sunglasses) can be used in lieu of sunscreen but must be worn the entire time you are exposed to the sun's rays.\\n\\nBrands of sunscreen that I like include CeraVe, La Roche-Posay Anthelios, Cetaphil, Vanicream, Neutrogena, Eucerin, Pipette, Blue Lizard, Elta MD, TIZO, Mendez, Екатерина's Choice, Drunk Elephant, Supergoop!, Coola, ASHLEY, Cotz Plus, Avene, De, ZO Skin Health, Obagi MD, MARGARITA, Skinceuticals
Detail Level: Detailed

## 2025-04-29 NOTE — ED ADULT NURSE REASSESSMENT NOTE - NS ED NURSE REASSESS COMMENT FT1
Patient lying in bed cardiac monitor in place patient refusing to talk with staff. Fall precautions in place. Admitted to SDU awaiting bed.

## 2025-04-29 NOTE — PROGRESS NOTE ADULT - ATTENDING COMMENTS
70 year old F with PMHx CVA (residual mild expressive aphasia, and dysarthria), HTN, HLD, Type II DM, and HIV on Biktarvy, who presented to the ED with her daughter-in-law from an assisted living facility after she was thought to have a syncopal episode vs. seizure. Found to have right lobar/segmental PE with right heart strain, admitted to SDU.     #Right lobar/segmental pulmonary emboli with Right heart strain  - presented after syncopal episode vs seizure  - VS on admission significant for soft BP 85/58,  orthostatics performed (-)  - Trop 15 --> 13, Pro-BNP <36  - CT Angio Chest w/ PE Protocol: Right lobar/segmental pulmonary emboli. Right heart strain is noted.  PLAN:  - c/w Lovenox 70 mg BID  - TTE -> performed, pending read  - venous duplex lower extremities  - Hypercoagulable workup ordered as history cannot be properly obtained at time of admission-- Protein C, S, Factor V Leiden, Homocysteine, Beta 2 glycoprotein, Anticardiolipin Ab - reordered as pt refused, she is likely to refuse again.     #Syncopal Episode vs Seizure   - for syncope workup-- orthostatics negative, obtain TTE, PT consult, ambulate with assistance, fall precautions  - seen by neuro for possible seizure, recs as below:  - CT Head non con --> unremarkable, shows chronic ischemic change  - EEG ordered.  - ASA given hx of stroke    #HTN  - from [facility]:  - was recently seen by Dr. Guadalupe at [facility] who determined losartan should be decreased to 50 mg BID (was 100 mg BID)  - was found to have hyperdynamic left ventricle, for which Dr. Guadalupe recommended metoprolol succinate 25 mg once a day in the morning   - holding all BP meds for now in setting of hypotension, resume as patient tolerates    #Type II DM - uncontrolled   - from [facility]:  - as per documentation from [facility] admission, HbA1c 14  - repeat A1c  - monitor glucose, ISS, increase insulin requirements as appropriate    #HLD  - from [facility]:  - c/w atorvastatin 40 mg qhs  - f/u lipid profile    #H/O Cardiac Surgery with midline sternotomy scar   - from [facility]:  - Details unknown     #HIV on Biktarvy  - unknown if patient follows with ID  - from [facility] HIE:  - c/w Biktarvy    MISC  #DVT prophylaxis: Lovenox BID  #GI prophylaxis: not indicated  #Diet: DASH, CC  #Activity: IAT

## 2025-04-29 NOTE — PHYSICAL THERAPY INITIAL EVALUATION ADULT - SPECIFY REASON(S)
pt has a RLE DVT and is on Lovenox - 1st dose 4/28 at 2100. PT will f/u when pt has been on ACs for 24 hours.

## 2025-04-29 NOTE — ED ADULT NURSE REASSESSMENT NOTE - NS ED NURSE REASSESS COMMENT FT1
Patient continues to refuse care and gets verbally aggressive with staff. Patient informed of the importance of medication and care compliance and patient continues to refuse. Dr. David brown.

## 2025-04-29 NOTE — PATIENT PROFILE ADULT - FUNCTIONAL ASSESSMENT - BASIC MOBILITY 2.
4 = No assist / stand by assistance

## 2025-04-29 NOTE — PROGRESS NOTE ADULT - ASSESSMENT
Patient is a 70 year old F with PMHx CVA (residual mild expressive aphasia, and dysarthria), HTN, HLD, Type II DM, and HIV on Biktarvy, who presented to the ED with her daughter-in-law from an assisted living facility after she was thought to have a syncopal episode vs. seizure. Found to have right lobar/segmental PE with right heart strain, admitted to SDU.     #Right lobar/segmental pulmonary emboli with Right heart strain  - presented after syncopal episode vs seizure  - VS on admission significant for soft BP 85/58,  orthostatics performed (-)  - Trop 15 --> 13, Pro-BNP <36  - CT Angio Chest w/ PE Protocol: Right lobar/segmental pulmonary emboli. Right heart strain is noted.  PLAN:  - c/w Lovenox 70 mg BID  - TTE  - venous duplex lower extremities  - Hypercoagulable workup ordered as history cannot be properly obtained at time of admission-- Protein C, S, Factor V Leiden, Homocysteine, Beta 2 glycoprotein, Anticardiolipin Ab - reordered as pt refused, she is likely to refuse again.     #Syncopal Episode vs Seizure   - for syncope workup-- orthostatics negative, obtain TTE, PT consult, ambulate with assistance, fall precautions  - seen by neuro for possible seizure, recs as below:  --CT Head non con --> unremarkable, shows chronic ischemic change  --EEG   -- ASA given hx of stroke    #HTN  - from [facility]:  --was recently seen by Dr. Guadalupe at [facility] who determined losartan should be decreased to 50 mg BID (was 100 mg BID)  - was found to have hyperdynamic left ventricle, for which Dr. Guadalupe recommended metoprolol succinate 25 mg once a day in the morning   - holding all BP meds for now in setting of hypotension, resume as patient tolerates    #Type II DM - uncontrolled   - from [facility]:  - as per documentation from [facility] admission, HbA1c 14  - repeat A1c  - monitor glucose, ISS, increase insulin requirements as appropriate    #HLD  - from [facility]:  - c/w atorvastatin 40 mg qhs  - f/u lipid profile    #H/O Cardiac Surgery with midline sternotomy scar   - from [facility]:  - Details unknown     #HIV on Biktarvy  - unknown if patient follows with ID  - from [facility] HIE:  - c/w Biktarvy    MISC  #DVT prophylaxis: Lovenox BID  #GI prophylaxis: not indicated  #Diet: DASH, CC  #Activity: IAT     Patient is a 70 year old F with PMHx CVA (residual mild expressive aphasia, and dysarthria), HTN, HLD, Type II DM, and HIV on Biktarvy, who presented to the ED with her daughter-in-law from an assisted living facility after she was thought to have a syncopal episode vs. seizure. Found to have right lobar/segmental PE with right heart strain, admitted to SDU.     #Right lobar/segmental pulmonary emboli with Right heart strain  - presented after syncopal episode vs seizure  - VS on admission significant for soft BP 85/58,  orthostatics performed (-)  - Trop 15 --> 13, Pro-BNP <36  - CT Angio Chest w/ PE Protocol: Right lobar/segmental pulmonary emboli. Right heart strain is noted.  PLAN:  - c/w Lovenox 70 mg BID  - TTE -> performed, pending read  - venous duplex lower extremities  - Hypercoagulable workup ordered as history cannot be properly obtained at time of admission-- Protein C, S, Factor V Leiden, Homocysteine, Beta 2 glycoprotein, Anticardiolipin Ab - reordered as pt refused, she is likely to refuse again.     #Syncopal Episode vs Seizure   - for syncope workup-- orthostatics negative, obtain TTE, PT consult, ambulate with assistance, fall precautions  - seen by neuro for possible seizure, recs as below:  - CT Head non con --> unremarkable, shows chronic ischemic change  - EEG ordered.  - ASA given hx of stroke    #HTN  - from [facility]:  - was recently seen by Dr. Guadalupe at [facility] who determined losartan should be decreased to 50 mg BID (was 100 mg BID)  - was found to have hyperdynamic left ventricle, for which Dr. Guadalupe recommended metoprolol succinate 25 mg once a day in the morning   - holding all BP meds for now in setting of hypotension, resume as patient tolerates    #Type II DM - uncontrolled   - from [facility]:  - as per documentation from [facility] admission, HbA1c 14  - repeat A1c  - monitor glucose, ISS, increase insulin requirements as appropriate    #HLD  - from [facility]:  - c/w atorvastatin 40 mg qhs  - f/u lipid profile    #H/O Cardiac Surgery with midline sternotomy scar   - from [facility]:  - Details unknown     #HIV on Biktarvy  - unknown if patient follows with ID  - from [facility] HIE:  - c/w Biktarvy    MISC  #DVT prophylaxis: Lovenox BID  #GI prophylaxis: not indicated  #Diet: DASH, CC  #Activity: IAT

## 2025-04-29 NOTE — PATIENT PROFILE ADULT - FALL HARM RISK - HARM RISK INTERVENTIONS
Communicate Risk of Fall with Harm to all staff/Reinforce activity limits and safety measures with patient and family/Tailored Fall Risk Interventions/Use of alarms - bed, chair and/or voice tab/Visual Cue: Yellow wristband and red socks/Bed in lowest position, wheels locked, appropriate side rails in place/Call bell, personal items and telephone in reach/Instruct patient to call for assistance before getting out of bed or chair/Non-slip footwear when patient is out of bed/[place] to call system/Physically safe environment - no spills, clutter or unnecessary equipment/Purposeful Proactive Rounding/Room/bathroom lighting operational, light cord in reach

## 2025-04-29 NOTE — CHART NOTE - NSCHARTNOTEFT_GEN_A_CORE
Had a conversation with pt's stepdaughter who states she is the HCP, JOHNMA Ph: [phone]. She states pt lives alone and her  Rafat lu is the primary caregiver. But Rafat has been at [facility] for the past month with a new diagnosis of lymphoma. he is currently admitted at [facility] as well. Alma brought them both in yday.     She states that pt is AAOX1-2 at baseline with progressive dementia. She states that pt is unable to care for herself and is non compliant with her meds.     Med rec confirmed with her.

## 2025-04-29 NOTE — PATIENT PROFILE ADULT - ARE SIGNIFICANT INDICATORS COMPLETE.
Medicare Annual Wellness Visit    Simón Mendez is here for Medicare AWV    Assessment & Plan   Initial Medicare annual wellness visit  Smoker  Assessment & Plan:   Patient is counseled to quit smoking  B12 deficiency  Assessment & Plan:  B12 level within acceptable range on B12 supplements. Primary hypertension  Assessment & Plan:   Blood pressure is stable on losartan 50 mg once a day. Hyperlipidemia, unspecified hyperlipidemia type  Assessment & Plan:   LDL within acceptable range. To continue on Zocor 40 mg once a day  ACP (advance care planning)  -     [facility] -  Referral to [facility] Clinical Specialist  Prediabetes  Assessment & Plan:   Patient is suggested to reduce carbohydrate intake, calorie intake, increasing exercise  Orders:  -     Hemoglobin A1C; Future    Recommendations for Preventive Services Due: see orders and patient instructions/AVS.  Recommended screening schedule for the next 5-10 years is provided to the patient in written form: see Patient Instructions/AVS.     No follow-ups on file. Subjective       Patient's complete Health Risk Assessment and screening values have been reviewed and are found in Flowsheets. The following problems were reviewed today and where indicated follow up appointments were made and/or referrals ordered.     Positive Risk Factor Screenings with Interventions:                     Safety:  Do you have either shower bars, grab bars, non-slip mats or non-slip surfaces in your shower or bathtub?: (!) No  Interventions:  Patient advised to follow up in the office for further evaluation and treatment      Tobacco Use:  Tobacco Use: High Risk    Smoking Tobacco Use: Every Day    Smokeless Tobacco Use: Current    Passive Exposure: Not on file     E-cigarette/Vaping       Questions Responses    E-cigarette/Vaping Use Never User    Start Date     Passive Exposure No    Quit Date     Counseling Given No    Comments           Interventions:  Patient advised to follow up in the Philip Guidry presents today for   Chief Complaint   Patient presents with    Medicare AWV                 1. \"Have you been to the ER, urgent care clinic since your last visit? Hospitalized since your last visit? \" no    2. \"Have you seen or consulted any other health care providers outside of the [address] [place] [address] since your last visit? \" no     3. For patients aged 39-70: Has the patient had a colonoscopy / FIT/ Cologuard? Yes - no Care Gap present      If the patient is female:    4. For patients aged 41-77: Has the patient had a mammogram within the past 2 years? NA - based on age or sex      11. For patients aged 21-65: Has the patient had a pap smear?  NA - based on age or sex Negative. Hematological: Negative. Psychiatric/Behavioral: Negative. Vitals:    06/01/23 0832 06/01/23 0834   BP: (!) 141/67 (!) 107/57   Pulse: 65    Resp: 18    Temp: 97.3 °F (36.3 °C)    TempSrc: Temporal    SpO2: 95%    Weight: 197 lb (89.4 kg)    Height: 6' (1.829 m)      Physical Exam  Constitutional:       Appearance: Normal appearance. HENT:      Head: Normocephalic and atraumatic. Nose: Nose normal.      Mouth/Throat:      Mouth: Mucous membranes are moist.   Eyes:      Extraocular Movements: Extraocular movements intact. Pupils: Pupils are equal, round, and reactive to light. Cardiovascular:      Rate and Rhythm: Normal rate and regular rhythm. Heart sounds: Normal heart sounds. Pulmonary:      Breath sounds: Normal breath sounds. Abdominal:      General: Abdomen is flat. Palpations: Abdomen is soft. Musculoskeletal:         General: Normal range of motion. Cervical back: Normal range of motion and neck supple. Skin:     General: Skin is warm. Neurological:      General: No focal deficit present. Mental Status: He is alert. Mental status is at baseline. We discussed the diagnosis, risks and benefits of medications    Disclaimer: The patient understands our medical plan. Alternatives have been explained and offered. The risks, benefits and significant side effects of all medications have been reviewed. Anticipated time course and progression of condition reviewed. All questions have been addressed. He is encouraged to employ the information provided in the after visit summary, which was reviewed. Where appropriate, he is instructed to call the clinic if he has not been notified either by phone or through [address] with the results of his tests or with an appointment plan for any referrals within 1 week(s). No news is not good news; it's no news.  The patient  is to call if his condition worsens or fails to improve or if significant side Yes

## 2025-04-30 LAB
A1C WITH ESTIMATED AVERAGE GLUCOSE RESULT: 11.5 % — HIGH (ref 4–5.6)
ANION GAP SERPL CALC-SCNC: 9 MMOL/L — SIGNIFICANT CHANGE UP (ref 7–14)
BUN SERPL-MCNC: 11 MG/DL — SIGNIFICANT CHANGE UP (ref 10–20)
CALCIUM SERPL-MCNC: 9.5 MG/DL — SIGNIFICANT CHANGE UP (ref 8.4–10.5)
CHLORIDE SERPL-SCNC: 110 MMOL/L — SIGNIFICANT CHANGE UP (ref 98–110)
CO2 SERPL-SCNC: 23 MMOL/L — SIGNIFICANT CHANGE UP (ref 17–32)
CREAT SERPL-MCNC: 0.8 MG/DL — SIGNIFICANT CHANGE UP (ref 0.7–1.5)
EGFR: 80 ML/MIN/1.73M2 — SIGNIFICANT CHANGE UP
EGFR: 80 ML/MIN/1.73M2 — SIGNIFICANT CHANGE UP
ESTIMATED AVERAGE GLUCOSE: 283 MG/DL — HIGH (ref 68–114)
GLUCOSE BLDC GLUCOMTR-MCNC: 113 MG/DL — HIGH (ref 70–99)
GLUCOSE BLDC GLUCOMTR-MCNC: 114 MG/DL — HIGH (ref 70–99)
GLUCOSE BLDC GLUCOMTR-MCNC: 118 MG/DL — HIGH (ref 70–99)
GLUCOSE BLDC GLUCOMTR-MCNC: 153 MG/DL — HIGH (ref 70–99)
GLUCOSE SERPL-MCNC: 114 MG/DL — HIGH (ref 70–99)
HCT VFR BLD CALC: 32.1 % — LOW (ref 37–47)
HGB BLD-MCNC: 11 G/DL — LOW (ref 12–16)
MCHC RBC-ENTMCNC: 33.1 PG — HIGH (ref 27–31)
MCHC RBC-ENTMCNC: 34.3 G/DL — SIGNIFICANT CHANGE UP (ref 32–37)
MCV RBC AUTO: 96.7 FL — SIGNIFICANT CHANGE UP (ref 81–99)
NRBC BLD AUTO-RTO: 0 /100 WBCS — SIGNIFICANT CHANGE UP (ref 0–0)
PLATELET # BLD AUTO: 235 K/UL — SIGNIFICANT CHANGE UP (ref 130–400)
PMV BLD: 10.8 FL — HIGH (ref 7.4–10.4)
POTASSIUM SERPL-MCNC: 3.9 MMOL/L — SIGNIFICANT CHANGE UP (ref 3.5–5)
POTASSIUM SERPL-SCNC: 3.9 MMOL/L — SIGNIFICANT CHANGE UP (ref 3.5–5)
RBC # BLD: 3.32 M/UL — LOW (ref 4.2–5.4)
RBC # FLD: 12.6 % — SIGNIFICANT CHANGE UP (ref 11.5–14.5)
SODIUM SERPL-SCNC: 142 MMOL/L — SIGNIFICANT CHANGE UP (ref 135–146)
WBC # BLD: 5.29 K/UL — SIGNIFICANT CHANGE UP (ref 4.8–10.8)
WBC # FLD AUTO: 5.29 K/UL — SIGNIFICANT CHANGE UP (ref 4.8–10.8)

## 2025-04-30 PROCEDURE — 99232 SBSQ HOSP IP/OBS MODERATE 35: CPT

## 2025-04-30 PROCEDURE — 99223 1ST HOSP IP/OBS HIGH 75: CPT

## 2025-04-30 RX ORDER — APIXABAN 2.5 MG/1
2 TABLET, FILM COATED ORAL
Qty: 120 | Refills: 0
Start: 2025-04-30 | End: 2025-05-29

## 2025-04-30 RX ADMIN — ATORVASTATIN CALCIUM 40 MILLIGRAM(S): 80 TABLET, FILM COATED ORAL at 21:48

## 2025-04-30 RX ADMIN — Medication 81 MILLIGRAM(S): at 12:56

## 2025-04-30 RX ADMIN — QUETIAPINE FUMARATE 200 MILLIGRAM(S): 25 TABLET ORAL at 21:48

## 2025-04-30 RX ADMIN — INSULIN GLARGINE-YFGN 16 UNIT(S): 100 INJECTION, SOLUTION SUBCUTANEOUS at 21:49

## 2025-04-30 RX ADMIN — INSULIN LISPRO 5 UNIT(S): 100 INJECTION, SOLUTION INTRAVENOUS; SUBCUTANEOUS at 17:18

## 2025-04-30 RX ADMIN — Medication 1 APPLICATION(S): at 12:52

## 2025-04-30 RX ADMIN — INSULIN LISPRO 5 UNIT(S): 100 INJECTION, SOLUTION INTRAVENOUS; SUBCUTANEOUS at 08:58

## 2025-04-30 RX ADMIN — INSULIN LISPRO 5 UNIT(S): 100 INJECTION, SOLUTION INTRAVENOUS; SUBCUTANEOUS at 12:52

## 2025-04-30 RX ADMIN — Medication 5 MILLIGRAM(S): at 21:48

## 2025-04-30 RX ADMIN — ENOXAPARIN SODIUM 70 MILLIGRAM(S): 100 INJECTION SUBCUTANEOUS at 05:33

## 2025-04-30 NOTE — CONSULT NOTE ADULT - ATTENDING COMMENTS
IMPRESSION:    Acute right segmental/subsegmental PE no right heart strain  Seizure episode vs presyncopal episode  Right peroneal vein DVT  H/O DM  H/O HTN  H/O CVA      Plan as outlined above

## 2025-04-30 NOTE — CHART NOTE - NSCHARTNOTEFT_GEN_A_CORE
HPI:   70 year old F with PMHx CVA (residual mild expressive aphasia, and dysarthria), HTN, HLD, Type II DM, and HIV on Biktarvy, who presented to the ED with her daughter-in-law from an assisted living facility (?) after she was thought to have a syncopal episode vs. seizure. As per ED note, patient's daughter in law reported patient appeared to be talking when she began shaking; she was also very diaphoretic, no clear post ictal state. As per ED note, patient denied any fevers, chills, current chest pain, shortness of breath, or palpitations.     Seen by neuro-- history obtained makes seizure unlikely. However given her hx of HIV and stroke, seizures can't be completely ruled out. Recommended CT Head non con. routine labs, EEG, and starting ASA given hx of stroke.    OF NOTE: on my exam, patient is uncooperative. Patient's  is also being admitted with a bed in the same room, and would interject my conversation with the patient by yelling "don't tell her anything" and "she is the police." Patient would then stop talking mid-sentence and refuse to answer my questions, asking me "why I need to know." Despite explaining at length that I am a resident physician who is asking questions in regards to her medical history, she was convinced by her  not to speak further and that she "does not care" if not speaking will negatively affect her hospital admission and long term outcomes.     Vitals in ED: T 97.6, HR 88, BP 85/58 (orthostatics performed, negative), RR 16, sat 95% on RA  Labs in ED: Trop 15 --> 13, Pro-BNP <36, VBG 7.33/51/21/27  EKG:  Imaging:  CT Head: No evidence of acute intracranial abnormality. Chronic ischemic change.  CT Abd/Pelvis w/ IV contrast: There is no definite pathology within the abdomen but please note there is large amount of artifact particularly within the left lower quadrant which limits evaluation. Filling defects noted in right middle/lower lobes.  CT Angio Chest w/ PE Protocol: Right lobar/segmental pulmonary emboli. Right heart strain is noted.  Interventions:  - NS 1L bolus x1, and Lovenox 70 mg subcutaneous x1. Admitted to SDU.     CEU:  Patient remained stabl;e in the CEU. No chest pain, no episode of seizures. Refusing EEG. Stable for downgrade to floor      ROS:  Patient non cooperative unable to perform    PE:  CONSTITUTIONAL: NAD  ENT: Airway patent,   EYES: pupils equal,  round and reactive to light.  CARDIAC: Normal rate, Regular rhythm.  Heart sounds S1, S2.   RESPIRATORY: No wheezing Normal chest expansionNo use of accessory muscles  GASTROINTESTINAL:Abdomen soft Non-tender, No guarding, + BS  MUSCULOSKELETAL: No clubbing, cyanosis  NEUROLOGICAL: Alert and oriented   SKIN: Skin normal color          Assessment and Plan:  70 year old F with PMHx CVA (residual mild expressive aphasia, and dysarthria), HTN, HLD, Type II DM, and HIV on Biktarvy, who presented to the ED with her daughter-in-law from an assisted living facility after she was thought to have a syncopal episode vs. seizure. Found to have right lobar/segmental PE with right heart strain, admitted to SDU.     #Right lobar/segmental pulmonary emboli with Right heart strain  - presented after syncopal episode vs seizure  - VS on admission significant for soft BP 85/58,  orthostatics performed (-)  - Trop 15 --> 13, Pro-BNP <36  - CT Angio Chest w/ PE Protocol: Right lobar/segmental pulmonary emboli. Right heart strain is noted.  PLAN:  - c/w Lovenox 70 mg BID  - TTE -> performed, pending read  - venous duplex lower extremities  - Hypercoagulable workup ordered as history cannot be properly obtained at time of admission-- Protein C, S, Factor V Leiden, Homocysteine, Beta 2 glycoprotein, Anticardiolipin Ab - reordered as pt refused,  - no chest pain this AM  - DGTF on anticoagulation    #Syncopal Episode vs Seizure   - for syncope workup-- orthostatics negative, obtain TTE, PT consult, ambulate with assistance, fall precautions  - seen by neuro for possible seizure, recs as below:  - CT Head non con --> unremarkable, shows chronic ischemic change  - EEG ordered- patient refusing-  will try again  - ASA given hx of stroke    #HTN  - from [facility] HIE:  - was recently seen by Dr. Guadalupe at [facility] who determined losartan should be decreased to 50 mg BID (was 100 mg BID)  - was found to have hyperdynamic left ventricle, for which Dr. Guadalupe recommended metoprolol succinate 25 mg once a day in the morning   - holding all BP meds for now in setting of hypotension, resume as patient tolerates    #Type II DM - uncontrolled   - from [facility]:  - as per documentation from [facility] admission, HbA1c 14  - repeat A1c  - monitor glucose, ISS, increase insulin requirements as appropriate    #HLD  - from [facility]:  - c/w atorvastatin 40 mg qhs  - f/u lipid profile    #H/O Cardiac Surgery with midline sternotomy scar   - from [facility]:  - Details unknown     #HIV on Biktarvy  - unknown if patient follows with ID  - from [facility]:  - c/w Biktarvy      DVT prophylaxis: Lovenox BID  GI prophylaxis: not indicated  Diet: DASH, CC  code: full    pending: DGTF, EEG, neuro follow up

## 2025-04-30 NOTE — CONSULT NOTE ADULT - SUBJECTIVE AND OBJECTIVE BOX
NEUROLOGY CONSULT    HPI:  69 y/o F with a PMHx of Stroke in 2020 (residual mild expressive aphasia, and dysarthria), HLD, HIV (on Biktarvy), DM, distant hx of open heart surgery?, and recently discharged from [facility] for the workup of intermittent chest pain with EKG changes, was brought to the ED by her daughter in law after an episode transient impaired awareness. Per family patient went to visit her  at a rehab facility and she appeared to be bit "wobbly". Once at the facility, the patient appeared to be feel unwell, she went to sit down on a chair and she suddenly closed her eyes and was seen shaking momentarily (patient reportedly was talking during that episode) and showing some transient stiffness on her R arm. She then appeared to "fall asleep" for 2-5 mins. She was back to baseline upon waking up but sweating profusely. Patient does not want to be in the hospital and was not very collaborative during the interview. No loss of sphincter control, no tongue bitting. No fevers or recent infections. Sister in law had norovirus recently, but patient reportedly never contracted. it Unknown seizure family history, or TBI history. 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 Patient has been reportedly been complaining of abdominal pain since her last [facility] admission. Her RUQ was tender on palpation.      MEDICATIONS  Home Medications:    MEDICATIONS  (STANDING):  sodium chloride 0.9% Bolus 1000 milliLiter(s) IV Bolus once    MEDICATIONS  (PRN):      FAMILY HISTORY:    SOCIAL HISTORY: negative for tobacco, alcohol, or ilicit drug use.    Allergies    fish (Short breath; Hives)  No Known Drug Allergies    Intolerances        GEN: NAD, pleasant, cooperative    NEURO:   MENTAL STATUS: Alert and oriented to self, age, year, but not to month.   LANG/SPEECH: Chronically dysarthric. Mild word finding difficulties.   CRANIAL NERVES:  II: Pupils equal and reactive, no RAPD, normal visual field and fundus  III, IV, VI: EOM intact, no gaze preference or deviation  V: normal  VII: no facial asymmetry  VIII: normal hearing to speech  MOTOR: 5/5 in both upper and lower extremities  REFLEXES: 2+ b/l UE. Mute patellars  SENSORY: Normal to touch, temperature & vibration in all extremities  COORD: Normal finger to nose, no tremor, no dysmetria      LABS:          Hemoglobin A1C:   Vitamin B12     CAPILLARY BLOOD GLUCOSE      POCT Blood Glucose.: 168 mg/dL (28 Apr 2025 15:23)              Microbiology:      RADIOLOGY, EKG AND ADDITIONAL TESTS: Reviewed.          
Patient is a 70y old  Female who presents with a chief complaint of     HPI:  Patient is a 70 year old F with PMHx CVA (residual mild expressive aphasia, and dysarthria), HTN, HLD, Type II DM, and HIV on Biktarvy, who presented to the ED with her daughter-in-law from an assisted living facility (?) after she was thought to have a syncopal episode vs. seizure. As per ED note, patient's daughter in law reported patient appeared to be talking when she began shaking; she was also very diaphoretic, no clear post ictal state. As per ED note, patient denied any fevers, chills, current chest pain, shortness of breath, or palpitations.     Seen by neuro-- history obtained makes seizure unlikely. However given her hx of HIV and stroke, seizures can't be completely ruled out. Recommended CT Head non con. routine labs, EEG, and starting ASA given hx of stroke.    OF NOTE: on my exam, patient is uncooperative. Patient's  is also being admitted with a bed in the same room, and would interject my conversation with the patient by yelling "don't tell her anything" and "she is the police." Patient would then stop talking mid-sentence and refuse to answer my questions, asking me "why I need to know." Despite explaining at length that I am a resident physician who is asking questions in regards to her medical history, she was convinced by her  not to speak further and that she "does not care" if not speaking will negatively affect her hospital admission and long term outcomes.     Vitals in ED: T 97.6, HR 88, BP 85/58 (orthostatics performed, negative), RR 16, sat 95% on RA  Labs in ED: Trop 15 --> 13, Pro-BNP <36, VBG 7.33/51/21/27  EKG:  Imaging:  CT Head: No evidence of acute intracranial abnormality. Chronic ischemic change.  CT Abd/Pelvis w/ IV contrast: There is no definite pathology within the abdomen but please note there is large amount of artifact particularly within the left lower quadrant which limits evaluation. Filling defects noted in right middle/lower lobes.  CT Angio Chest w/ PE Protocol: Right lobar/segmental pulmonary emboli. Right heart strain is noted.  Interventions:  - NS 1L bolus x1, and Lovenox 70 mg subcutaneous x1. Admitted to SDU.        (28 Apr 2025 23:59)      PAST MEDICAL & SURGICAL HISTORY:          FAMILY HISTORY:  :  No known cardiovacular family hisotry     Review Of Systems:     All ROS are negative except per HPI       Allergies    fish (Short breath; Hives)  No Known Drug Allergies    Intolerances          PHYSICAL EXAM    ICU Vital Signs Last 24 Hrs  T(C): 36.4 (30 Apr 2025 08:00), Max: 36.9 (29 Apr 2025 17:27)  T(F): 97.6 (30 Apr 2025 08:00), Max: 98.4 (29 Apr 2025 17:27)  HR: 77 (30 Apr 2025 04:00) (72 - 81)  BP: 104/57 (30 Apr 2025 08:00) (94/54 - 182/77)  BP(mean): 75 (30 Apr 2025 08:00) (69 - 85)  ABP: --  ABP(mean): --  RR: 20 (30 Apr 2025 08:00) (18 - 20)  SpO2: 98% (30 Apr 2025 08:00) (98% - 98%)    O2 Parameters below as of 29 Apr 2025 17:27  Patient On (Oxygen Delivery Method): room air            CONSTITUTIONAL:  NAD    ENT:   Airway patent,       EYES:   pupils equal,   round and reactive to light.    CARDIAC:   Normal rate,   Regular rhythm.    Heart sounds S1, S2.       RESPIRATORY:   No wheezing   Normal chest expansion  No use of accessory muscles    GASTROINTESTINAL:  Abdomen soft   Non-tender,   No guarding,   + BS      MUSCULOSKELETAL:   Nno clubbing, cyanosis    NEUROLOGICAL:   Alert and oriented     SKIN:   Skin normal color for race,               04-29-25 @ 07:01  -  04-30-25 @ 07:00  --------------------------------------------------------  IN:    Oral Fluid: 200 mL  Total IN: 200 mL    OUT:    Voided (mL): 250 mL  Total OUT: 250 mL    Total NET: -50 mL          LABS:                          11.0   5.29  )-----------( 235      ( 30 Apr 2025 05:40 )             32.1                                               04-30    142  |  110  |  11  ----------------------------<  114[H]  3.9   |  23  |  0.8    Ca    9.5      30 Apr 2025 05:40  Mg     2.0     04-28    TPro  8.8[H]  /  Alb  4.5  /  TBili  1.0  /  DBili  x   /  AST  35  /  ALT  12  /  AlkPhos  123[H]  04-28                                             Urinalysis Basic - ( 30 Apr 2025 05:40 )    Color: x / Appearance: x / SG: x / pH: x  Gluc: 114 mg/dL / Ketone: x  / Bili: x / Urobili: x   Blood: x / Protein: x / Nitrite: x   Leuk Esterase: x / RBC: x / WBC x   Sq Epi: x / Non Sq Epi: x / Bacteria: x                                                  LIVER FUNCTIONS - ( 28 Apr 2025 15:54 )  Alb: 4.5 g/dL / Pro: 8.8 g/dL / ALK PHOS: 123 U/L / ALT: 12 U/L / AST: 35 U/L / GGT: x                                                                                                                                       X-Rays reviewed:                                                                                    ECHO    CXR interpreted by me:    MEDICATIONS  (STANDING):  aspirin enteric coated 81 milliGRAM(s) Oral daily  atorvastatin 40 milliGRAM(s) Oral at bedtime  bictegravir 50 mG/emtricitabine 200 mG/tenofovir alafenamide 25 mG (BIKTARVY) 1 Tablet(s) Oral daily  dextrose 5%. 1000 milliLiter(s) (100 mL/Hr) IV Continuous <Continuous>  dextrose 5%. 1000 milliLiter(s) (50 mL/Hr) IV Continuous <Continuous>  dextrose 50% Injectable 25 Gram(s) IV Push once  dextrose 50% Injectable 12.5 Gram(s) IV Push once  dextrose 50% Injectable 25 Gram(s) IV Push once  enoxaparin Injectable 70 milliGRAM(s) SubCutaneous every 12 hours  glucagon  Injectable 1 milliGRAM(s) IntraMuscular once  insulin glargine Injectable (LANTUS) 16 Unit(s) SubCutaneous at bedtime  insulin lispro (ADMELOG) corrective regimen sliding scale   SubCutaneous three times a day before meals  insulin lispro Injectable (ADMELOG) 5 Unit(s) SubCutaneous three times a day before meals  losartan 100 milliGRAM(s) Oral daily  QUEtiapine 200 milliGRAM(s) Oral at bedtime  zolpidem 5 milliGRAM(s) Oral at bedtime    MEDICATIONS  (PRN):  dextrose Oral Gel 15 Gram(s) Oral once PRN Blood Glucose LESS THAN 70 milliGRAM(s)/deciliter

## 2025-04-30 NOTE — PROGRESS NOTE ADULT - ATTENDING COMMENTS
70 year old F with PMHx CVA (residual mild expressive aphasia, and dysarthria), HTN, HLD, Type II DM, and HIV on Biktarvy, who presented to the ED with her daughter-in-law from an assisted living facility after she was thought to have a syncopal episode vs. seizure. Found to have right lobar/segmental PE with right heart strain, admitted to SDU. Has remained HD stable, off pressors     Right lobar/segmental pulmonary emboli with Right heart strain  - presented after syncopal episode vs seizure  - Trop 15 --> 13, Pro-BNP <36, BP borderline   - CT Angio Chest w/ PE Protocol: Right lobar/segmental pulmonary emboli. Right heart strain is noted.  - Echo reviewed  - LE duplex with R peroneal DVT  - c/w Lovenox 70 mg BID, send eliquis to pharmacy for pricing  - will need OP heme onc eval and age appropriate screening     Syncopal Episode vs Seizure   - orthostats negative, CTH no acute process  - fu EEG  - ASA given hx of stroke  - PT    HTN  - from [facility]:  - was recently seen by Dr. Guadalupe at [facility] who determined losartan should be decreased to 50 mg BID (was 100 mg BID)  - was found to have hyperdynamic left ventricle, for which Dr. Guadalupe recommended metoprolol succinate 25 mg once a day in the morning   - holding all BP meds for now in setting of hypotension, resume as patient tolerates    Type II DM - uncontrolled   - from [facility]:  - as per documentation from [facility] admission, HbA1c 14  - monitor glucose, ISS for now and start basal/bolus insulin if FS >180    HLD  - c/w atorvastatin 40 mg qhs    H/O Cardiac Surgery with midline sternotomy scar     HIV on Biktarvy  - unknown if patient follows with ID, c/w Biktarvy. Check viral load. ID eval     Pending: EEG, PT, eliquis pricing, ID eval, viral load, monitor FS      Patient seen at bedside, total time spent to evaluate and treat the patient's acute illness and chronic medical conditions as well as time spent reviewing prior records, labs, radiology, documenting in electronic medical records,  discussing medical plan with   medical team was 45 minutes with >50% of time spent face to face with patient, discussing with patient/family as well as coordination of care

## 2025-04-30 NOTE — PROGRESS NOTE ADULT - SUBJECTIVE AND OBJECTIVE BOX
Patient is a 70y old  Female who presents with a chief complaint of     INTERVAL HPI/OVERNIGHT EVENTS: s/p colonoscopy    aspirin enteric coated 81 milliGRAM(s) Oral daily  atorvastatin 40 milliGRAM(s) Oral at bedtime  bictegravir 50 mG/emtricitabine 200 mG/tenofovir alafenamide 25 mG (BIKTARVY) 1 Tablet(s) Oral daily  dextrose 5%. 1000 milliLiter(s) IV Continuous <Continuous>  dextrose 5%. 1000 milliLiter(s) IV Continuous <Continuous>  dextrose 50% Injectable 25 Gram(s) IV Push once  dextrose 50% Injectable 12.5 Gram(s) IV Push once  dextrose 50% Injectable 25 Gram(s) IV Push once  dextrose Oral Gel 15 Gram(s) Oral once PRN  enoxaparin Injectable 70 milliGRAM(s) SubCutaneous every 12 hours  glucagon  Injectable 1 milliGRAM(s) IntraMuscular once  insulin glargine Injectable (LANTUS) 16 Unit(s) SubCutaneous at bedtime  insulin lispro (ADMELOG) corrective regimen sliding scale   SubCutaneous three times a day before meals  insulin lispro Injectable (ADMELOG) 5 Unit(s) SubCutaneous three times a day before meals  losartan 100 milliGRAM(s) Oral daily  QUEtiapine 200 milliGRAM(s) Oral at bedtime  zolpidem 5 milliGRAM(s) Oral at bedtime      REVIEW OF SYSTEMS:  patient non cooperative, unable to perform      T(C): 36.4 (04-30-25 @ 08:00), Max: 36.9 (04-29-25 @ 17:27)  HR: 77 (04-30-25 @ 04:00) (72 - 81)  BP: 104/57 (04-30-25 @ 08:00) (94/54 - 182/77)  RR: 20 (04-30-25 @ 08:00) (18 - 20)  SpO2: 98% (04-30-25 @ 08:00) (98% - 98%)  Wt(kg): --Vital Signs Last 24 Hrs  T(C): 36.4 (30 Apr 2025 08:00), Max: 36.9 (29 Apr 2025 17:27)  T(F): 97.6 (30 Apr 2025 08:00), Max: 98.4 (29 Apr 2025 17:27)  HR: 77 (30 Apr 2025 04:00) (72 - 81)  BP: 104/57 (30 Apr 2025 08:00) (94/54 - 182/77)  BP(mean): 75 (30 Apr 2025 08:00) (69 - 85)  RR: 20 (30 Apr 2025 08:00) (18 - 20)  SpO2: 98% (30 Apr 2025 08:00) (98% - 98%)    Parameters below as of 29 Apr 2025 17:27  Patient On (Oxygen Delivery Method): room air        PHYSICAL EXAM:   CONSTITUTIONAL: NAD  ENT: Airway patent,   EYES: pupils equal,  round and reactive to light.  CARDIAC: Normal rate, Regular rhythm.  Heart sounds S1, S2.   RESPIRATORY: No wheezing Normal chest expansionNo use of accessory muscles  GASTROINTESTINAL:Abdomen soft Non-tender, No guarding, + BS  MUSCULOSKELETAL: No clubbing, cyanosis  NEUROLOGICAL: Alert and oriented   SKIN: Skin normal color      Consultant(s) Notes Reviewed:  [x ] YES  [ ] NO  Care Discussed with Consultants/Other Providers [ x] YES  [ ] NO    LABS:                        11.0   5.29  )-----------( 235      ( 30 Apr 2025 05:40 )             32.1     04-30    142  |  110  |  11  ----------------------------<  114[H]  3.9   |  23  |  0.8    Ca    9.5      30 Apr 2025 05:40  Mg     2.0     04-28    TPro  8.8[H]  /  Alb  4.5  /  TBili  1.0  /  DBili  x   /  AST  35  /  ALT  12  /  AlkPhos  123[H]  04-28      Urinalysis Basic - ( 30 Apr 2025 05:40 )    Color: x / Appearance: x / SG: x / pH: x  Gluc: 114 mg/dL / Ketone: x  / Bili: x / Urobili: x   Blood: x / Protein: x / Nitrite: x   Leuk Esterase: x / RBC: x / WBC x   Sq Epi: x / Non Sq Epi: x / Bacteria: x      CAPILLARY BLOOD GLUCOSE      POCT Blood Glucose.: 113 mg/dL (30 Apr 2025 08:08)  POCT Blood Glucose.: 134 mg/dL (29 Apr 2025 20:42)  POCT Blood Glucose.: 158 mg/dL (29 Apr 2025 11:59)        Urinalysis Basic - ( 30 Apr 2025 05:40 )    Color: x / Appearance: x / SG: x / pH: x  Gluc: 114 mg/dL / Ketone: x  / Bili: x / Urobili: x   Blood: x / Protein: x / Nitrite: x   Leuk Esterase: x / RBC: x / WBC x   Sq Epi: x / Non Sq Epi: x / Bacteria: x          RADIOLOGY & ADDITIONAL TESTS:    Imaging Personally Reviewed:  [ ] YES  [ ] NO   Patient is a 70y old  Female who presents with a chief complaint of     INTERVAL HPI/OVERNIGHT EVENTS[place] [facility]    aspirin enteric coated 81 milliGRAM(s) Oral daily  atorvastatin 40 milliGRAM(s) Oral at bedtime  bictegravir 50 mG/emtricitabine 200 mG/tenofovir alafenamide 25 mG (BIKTARVY) 1 Tablet(s) Oral daily  dextrose 5%. 1000 milliLiter(s) IV Continuous <Continuous>  dextrose 5%. 1000 milliLiter(s) IV Continuous <Continuous>  dextrose 50% Injectable 25 Gram(s) IV Push once  dextrose 50% Injectable 12.5 Gram(s) IV Push once  dextrose 50% Injectable 25 Gram(s) IV Push once  dextrose Oral Gel 15 Gram(s) Oral once PRN  enoxaparin Injectable 70 milliGRAM(s) SubCutaneous every 12 hours  glucagon  Injectable 1 milliGRAM(s) IntraMuscular once  insulin glargine Injectable (LANTUS) 16 Unit(s) SubCutaneous at bedtime  insulin lispro (ADMELOG) corrective regimen sliding scale   SubCutaneous three times a day before meals  insulin lispro Injectable (ADMELOG) 5 Unit(s) SubCutaneous three times a day before meals  losartan 100 milliGRAM(s) Oral daily  QUEtiapine 200 milliGRAM(s) Oral at bedtime  zolpidem 5 milliGRAM(s) Oral at bedtime      REVIEW OF SYSTEMS:  patient non cooperative, unable to perform      T(C): 36.4 (04-30-25 @ 08:00), Max: 36.9 (04-29-25 @ 17:27)  HR: 77 (04-30-25 @ 04:00) (72 - 81)  BP: 104/57 (04-30-25 @ 08:00) (94/54 - 182/77)  RR: 20 (04-30-25 @ 08:00) (18 - 20)  SpO2: 98% (04-30-25 @ 08:00) (98% - 98%)  Wt(kg): --Vital Signs Last 24 Hrs  T(C): 36.4 (30 Apr 2025 08:00), Max: 36.9 (29 Apr 2025 17:27)  T(F): 97.6 (30 Apr 2025 08:00), Max: 98.4 (29 Apr 2025 17:27)  HR: 77 (30 Apr 2025 04:00) (72 - 81)  BP: 104/57 (30 Apr 2025 08:00) (94/54 - 182/77)  BP(mean): 75 (30 Apr 2025 08:00) (69 - 85)  RR: 20 (30 Apr 2025 08:00) (18 - 20)  SpO2: 98% (30 Apr 2025 08:00) (98% - 98%)    Parameters below as of 29 Apr 2025 17:27  Patient On (Oxygen Delivery Method): room air        PHYSICAL EXAM:   CONSTITUTIONAL: NAD  ENT: Airway patent,   EYES: pupils equal,  round and reactive to light.  CARDIAC: Normal rate, Regular rhythm.  Heart sounds S1, S2.   RESPIRATORY: No wheezing Normal chest expansionNo use of accessory muscles  GASTROINTESTINAL:Abdomen soft Non-tender, No guarding, + BS  MUSCULOSKELETAL: No clubbing, cyanosis  NEUROLOGICAL: Alert and oriented   SKIN: Skin normal color      Consultant(s) Notes Reviewed:  [x ] YES  [ ] NO  Care Discussed with Consultants/Other Providers [ x] YES  [ ] NO    LABS:                        11.0   5.29  )-----------( 235      ( 30 Apr 2025 05:40 )             32.1     04-30    142  |  110  |  11  ----------------------------<  114[H]  3.9   |  23  |  0.8    Ca    9.5      30 Apr 2025 05:40  Mg     2.0     04-28    TPro  8.8[H]  /  Alb  4.5  /  TBili  1.0  /  DBili  x   /  AST  35  /  ALT  12  /  AlkPhos  123[H]  04-28      Urinalysis Basic - ( 30 Apr 2025 05:40 )    Color: x / Appearance: x / SG: x / pH: x  Gluc: 114 mg/dL / Ketone: x  / Bili: x / Urobili: x   Blood: x / Protein: x / Nitrite: x   Leuk Esterase: x / RBC: x / WBC x   Sq Epi: x / Non Sq Epi: x / Bacteria: x      CAPILLARY BLOOD GLUCOSE      POCT Blood Glucose.: 113 mg/dL (30 Apr 2025 08:08)  POCT Blood Glucose.: 134 mg/dL (29 Apr 2025 20:42)  POCT Blood Glucose.: 158 mg/dL (29 Apr 2025 11:59)        Urinalysis Basic - ( 30 Apr 2025 05:40 )    Color: x / Appearance: x / SG: x / pH: x  Gluc: 114 mg/dL / Ketone: x  / Bili: x / Urobili: x   Blood: x / Protein: x / Nitrite: x   Leuk Esterase: x / RBC: x / WBC x   Sq Epi: x / Non Sq Epi: x / Bacteria: x          RADIOLOGY & ADDITIONAL TESTS:    Imaging Personally Reviewed:  [ ] YES  [ ] NO

## 2025-04-30 NOTE — CONSULT NOTE ADULT - ASSESSMENT
IMPRESSION:    Acute right segmental/subsegmental PE no right heart strain  Seizure episode vs presyncopal episode  Right peroneal vein DVT  H/o DM  H/o HTN  H/o CVA        PLAN:    CNS: Avoid depressants. F/u neuro recs regarding possible seizures    HEENT: Oral care    PULMONARY: On RA. HOB @ 45 degrees.  Aspiration precautions     CARDIOVASCULAR: Keep euvolemic    GI:  Feeding.  Bowel regimen PRN    RENAL:  Follow up lytes.  Correct as needed    INFECTIOUS DISEASE: Follow up cultures. Monitor off abx    HEMATOLOGICAL: Start Eliquis. Duplex negative    ENDOCRINE:  Follow up FS.  Insulin protocol if needed.    MUSCULOSKELETAL: AAT    DGTF           IMPRESSION:    Acute right segmental/subsegmental PE no right heart strain  Seizure episode vs presyncopal episode  Right peroneal vein DVT  H/O DM  H/O HTN  H/O CVA      PLAN:    CNS: Avoid depressants. F/u neuro recs regarding possible seizures    HEENT: Oral care    PULMONARY: On RA. HOB @ 45 degrees.  Aspiration precautions     CARDIOVASCULAR: Keep euvolemic    GI:  Feeding.  Bowel regimen PRN    RENAL:  Follow up lytes.  Correct as needed    INFECTIOUS DISEASE: Follow up cultures. Monitor off abx    HEMATOLOGICAL: Start Eliquis. Duplex negative    ENDOCRINE:  Follow up FS.  Insulin protocol if needed.    MUSCULOSKELETAL: AAT    DGTF

## 2025-04-30 NOTE — PROGRESS NOTE ADULT - ASSESSMENT
10 year old F with PMHx CVA (residual mild expressive aphasia, and dysarthria), HTN, HLD, Type II DM, and HIV on Biktarvy, who presented to the ED with her daughter-in-law from an assisted living facility after she was thought to have a syncopal episode vs. seizure. Found to have right lobar/segmental PE with right heart strain, admitted to SDU.     #Right lobar/segmental pulmonary emboli with Right heart strain  - presented after syncopal episode vs seizure  - VS on admission significant for soft BP 85/58,  orthostatics performed (-)  - Trop 15 --> 13, Pro-BNP <36  - CT Angio Chest w/ PE Protocol: Right lobar/segmental pulmonary emboli. Right heart strain is noted.  PLAN:  - c/w Lovenox 70 mg BID  - TTE -> performed, pending read  - venous duplex lower extremities  - Hypercoagulable workup ordered as history cannot be properly obtained at time of admission-- Protein C, S, Factor V Leiden, Homocysteine, Beta 2 glycoprotein, Anticardiolipin Ab - reordered as pt refused,  - no chest pain this AM  - DGTF on anticoagulation    #Syncopal Episode vs Seizure   - for syncope workup-- orthostatics negative, obtain TTE, PT consult, ambulate with assistance, fall precautions  - seen by neuro for possible seizure, recs as below:  - CT Head non con --> unremarkable, shows chronic ischemic change  - EEG ordered- patient refusing-  will try again  - ASA given hx of stroke    #HTN  - from [facility]:  - was recently seen by Dr. Guadalupe at [facility] who determined losartan should be decreased to 50 mg BID (was 100 mg BID)  - was found to have hyperdynamic left ventricle, for which Dr. Guadalupe recommended metoprolol succinate 25 mg once a day in the morning   - holding all BP meds for now in setting of hypotension, resume as patient tolerates    #Type II DM - uncontrolled   - from [facility]:  - as per documentation from [facility] admission, HbA1c 14  - repeat A1c  - monitor glucose, ISS, increase insulin requirements as appropriate    #HLD  - from [facility]:  - c/w atorvastatin 40 mg qhs  - f/u lipid profile    #H/O Cardiac Surgery with midline sternotomy scar   - from [facility]:  - Details unknown     #HIV on Biktarvy  - unknown if patient follows with ID  - from [facility] HIE:  - c/w Biktarvy      DVT prophylaxis: Lovenox BID  GI prophylaxis: not indicated  Diet: DASH, CC  code: full    pending: DGTF, EEG, neuro follow up 70 year old F with PMHx CVA (residual mild expressive aphasia, and dysarthria), HTN, HLD, Type II DM, and HIV on Biktarvy, who presented to the ED with her daughter-in-law from an assisted living facility after she was thought to have a syncopal episode vs. seizure. Found to have right lobar/segmental PE with right heart strain, admitted to SDU.     #Right lobar/segmental pulmonary emboli with Right heart strain  - presented after syncopal episode vs seizure  - VS on admission significant for soft BP 85/58,  orthostatics performed (-)  - Trop 15 --> 13, Pro-BNP <36  - CT Angio Chest w/ PE Protocol: Right lobar/segmental pulmonary emboli. Right heart strain is noted.  PLAN:  - c/w Lovenox 70 mg BID  - TTE -> performed, pending read  - venous duplex lower extremities  - Hypercoagulable workup ordered as history cannot be properly obtained at time of admission-- Protein C, S, Factor V Leiden, Homocysteine, Beta 2 glycoprotein, Anticardiolipin Ab - reordered as pt refused,  - no chest pain this AM  - DGTF on anticoagulation    #Syncopal Episode vs Seizure   - for syncope workup-- orthostatics negative, obtain TTE, PT consult, ambulate with assistance, fall precautions  - seen by neuro for possible seizure, recs as below:  - CT Head non con --> unremarkable, shows chronic ischemic change  - EEG ordered- patient refusing-  will try again  - ASA given hx of stroke    #HTN  - from [facility]:  - was recently seen by Dr. Guadalupe at [email or web address] who determined losartan should be decreased to 50 mg BID (was 100 mg BID)  - was found to have hyperdynamic left ventricle, for which Dr. Guadalupe recommended metoprolol succinate 25 mg once a day in the morning   - holding all BP meds for now in setting of hypotension, resume as patient tolerates    #Type II DM - uncontrolled   - from [facility]:  - as per documentation from [email or web address] admission, HbA1c 14  - repeat A1c  - monitor glucose, ISS, increase insulin requirements as appropriate    #HLD  - from [facility]:  - c/w atorvastatin 40 mg qhs  - f/u lipid profile    #H/O Cardiac Surgery with midline sternotomy scar   - from [facility]:  - Details unknown     #HIV on Biktarvy  - unknown if patient follows with ID  - from [facility] HIE:  - c/w Biktarvy      DVT prophylaxis: Lovenox BID  GI prophylaxis: not indicated  Diet: DASH, CC  code: full    pending: DGTF, EEG, neuro follow up

## 2025-05-01 ENCOUNTER — TRANSCRIPTION ENCOUNTER (OUTPATIENT)
Age: 70
End: 2025-05-01

## 2025-05-01 VITALS
DIASTOLIC BLOOD PRESSURE: 67 MMHG | SYSTOLIC BLOOD PRESSURE: 111 MMHG | RESPIRATION RATE: 18 BRPM | TEMPERATURE: 97 F | OXYGEN SATURATION: 98 % | HEART RATE: 81 BPM

## 2025-05-01 LAB
ANION GAP SERPL CALC-SCNC: 10 MMOL/L — SIGNIFICANT CHANGE UP (ref 7–14)
BUN SERPL-MCNC: 10 MG/DL — SIGNIFICANT CHANGE UP (ref 10–20)
CALCIUM SERPL-MCNC: 9.2 MG/DL — SIGNIFICANT CHANGE UP (ref 8.4–10.5)
CHLORIDE SERPL-SCNC: 106 MMOL/L — SIGNIFICANT CHANGE UP (ref 98–110)
CO2 SERPL-SCNC: 24 MMOL/L — SIGNIFICANT CHANGE UP (ref 17–32)
CREAT SERPL-MCNC: 0.8 MG/DL — SIGNIFICANT CHANGE UP (ref 0.7–1.5)
EGFR: 80 ML/MIN/1.73M2 — SIGNIFICANT CHANGE UP
EGFR: 80 ML/MIN/1.73M2 — SIGNIFICANT CHANGE UP
GLUCOSE BLDC GLUCOMTR-MCNC: 105 MG/DL — HIGH (ref 70–99)
GLUCOSE BLDC GLUCOMTR-MCNC: 117 MG/DL — HIGH (ref 70–99)
GLUCOSE BLDC GLUCOMTR-MCNC: 224 MG/DL — HIGH (ref 70–99)
GLUCOSE SERPL-MCNC: 101 MG/DL — HIGH (ref 70–99)
HCT VFR BLD CALC: 30.1 % — LOW (ref 37–47)
HGB BLD-MCNC: 10.5 G/DL — LOW (ref 12–16)
MAGNESIUM SERPL-MCNC: 1.7 MG/DL — LOW (ref 1.8–2.4)
MCHC RBC-ENTMCNC: 33.7 PG — HIGH (ref 27–31)
MCHC RBC-ENTMCNC: 34.9 G/DL — SIGNIFICANT CHANGE UP (ref 32–37)
MCV RBC AUTO: 96.5 FL — SIGNIFICANT CHANGE UP (ref 81–99)
MRSA PCR RESULT.: NEGATIVE — SIGNIFICANT CHANGE UP
NRBC BLD AUTO-RTO: 0 /100 WBCS — SIGNIFICANT CHANGE UP (ref 0–0)
PLATELET # BLD AUTO: 242 K/UL — SIGNIFICANT CHANGE UP (ref 130–400)
PMV BLD: 10.9 FL — HIGH (ref 7.4–10.4)
POTASSIUM SERPL-MCNC: 3.6 MMOL/L — SIGNIFICANT CHANGE UP (ref 3.5–5)
POTASSIUM SERPL-SCNC: 3.6 MMOL/L — SIGNIFICANT CHANGE UP (ref 3.5–5)
RBC # BLD: 3.12 M/UL — LOW (ref 4.2–5.4)
RBC # FLD: 12.8 % — SIGNIFICANT CHANGE UP (ref 11.5–14.5)
SODIUM SERPL-SCNC: 140 MMOL/L — SIGNIFICANT CHANGE UP (ref 135–146)
WBC # BLD: 5.65 K/UL — SIGNIFICANT CHANGE UP (ref 4.8–10.8)
WBC # FLD AUTO: 5.65 K/UL — SIGNIFICANT CHANGE UP (ref 4.8–10.8)

## 2025-05-01 PROCEDURE — 99239 HOSP IP/OBS DSCHRG MGMT >30: CPT

## 2025-05-01 RX ORDER — MAGNESIUM SULFATE 500 MG/ML
2 SYRINGE (ML) INJECTION ONCE
Refills: 0 | Status: COMPLETED | OUTPATIENT
Start: 2025-05-01 | End: 2025-05-01

## 2025-05-01 RX ADMIN — Medication 81 MILLIGRAM(S): at 11:07

## 2025-05-01 RX ADMIN — LOSARTAN POTASSIUM 100 MILLIGRAM(S): 100 TABLET, FILM COATED ORAL at 06:30

## 2025-05-01 RX ADMIN — INSULIN LISPRO 5 UNIT(S): 100 INJECTION, SOLUTION INTRAVENOUS; SUBCUTANEOUS at 11:43

## 2025-05-01 RX ADMIN — Medication 25 GRAM(S): at 11:08

## 2025-05-01 RX ADMIN — ENOXAPARIN SODIUM 70 MILLIGRAM(S): 100 INJECTION SUBCUTANEOUS at 06:30

## 2025-05-01 RX ADMIN — Medication 1 APPLICATION(S): at 06:30

## 2025-05-01 RX ADMIN — INSULIN LISPRO 4: 100 INJECTION, SOLUTION INTRAVENOUS; SUBCUTANEOUS at 11:43

## 2025-05-01 NOTE — DISCHARGE NOTE PROVIDER - NSDCCPCAREPLAN_GEN_ALL_CORE_FT
PRINCIPAL DISCHARGE DIAGNOSIS  Diagnosis: Pulmonary embolism  Assessment and Plan of Treatment: You have been diagnosed with a pulmonary embolism (PE), which is a blood clot that has traveled to your lungs and is blocking blood flow. This can make it difficult to breathe and can be serious if not treated promptly. You are currently on medications, Eliquis, to help dissolve the clot and prevent new ones from forming. It's very important to take these medications exactly as prescribed, even if you start to feel better. Be sure to let your doctor or nurse know right away if you experience any new chest pain, shortness of breath, or increasing difficulty breathing. You will likely need to take blood thinners for a longer period and have regular follow-up appointments with a hematologist (blood specialist). You also need to follow with your primary doctor for age appropriate cancer screening      SECONDARY DISCHARGE DIAGNOSES  Diagnosis: Syncope  Assessment and Plan of Treatment: You also experienced a syncopal episode, which means a temporary loss of consciousness, sometimes called fainting. This can happen for various reasons, such as dehydration, low blood pressure, or heart problems. In your case, it could have been related to the PE. Your doctors are working to determine the exact cause. To prevent future episodes, it’s important to avoid sudden changes in position, stay well-hydrated, and call for assistance if you feel lightheaded or faint. Follow up with neurology.    Diagnosis: HIV disease  Assessment and Plan of Treatment: You are living with HIV and are taking Biktarvy to manage it. Taking this medication exactly as prescribed is crucial to keep the virus under control and protect your health. It's important to have regular check-ups with an Infectious Disease specialist, who will monitor your HIV and adjust your treatment if necessary. Don’t hesitate to talk to your doctor about any concerns or side effects you may be experiencing.    Diagnosis: Diabetes  Assessment and Plan of Treatment: You also have diabetes, which means your body has trouble controlling your blood sugar. High blood sugar can lead to health problems over time, so managing your diabetes is very important. You will need to monitor your blood sugar regularly and need to take insulin.Eating healthy foods, watching your portion sizes, and limiting sugary foods and drinks are also important for managing your diabetes. Follow-up appointments with your doctor or an endocrinologist (diabetes specialist) are crucial to monitor your condition and adjust your treatment plan as needed.

## 2025-05-01 NOTE — DISCHARGE NOTE PROVIDER - NSDCMRMEDTOKEN_GEN_ALL_CORE_FT
atorvastatin 40 mg oral tablet: 1 tab(s) orally once a day (at bedtime)  Biktarvy 30 mg-120 mg-15 mg oral tablet: 1 tab(s) orally once a day  Cozaar 100 mg oral tablet: 1 tab(s) orally once a day  Eliquis Starter Pack for Treatment of DVT and PE 5 mg oral tablet: 2 tab(s) orally 2 times a day for 7 days and then 1 tab 2 times a day until you follow up with provider  insulin glargine 100 units/mL subcutaneous solution: 16 international unit(s) subcutaneous once a day (at bedtime)  NovoLIN 70/30 FlexPen subcutaneous suspension: 5 international unit(s) subcutaneous 3 times a day (with meals)  Seroquel 200 mg oral tablet: 1 tab(s) orally once a day (at bedtime)

## 2025-05-01 NOTE — PROGRESS NOTE ADULT - SUBJECTIVE AND OBJECTIVE BOX
24H events:    Today is hospital day 3d. This morning patient was seen and examined at bedside, resting comfortably in bed.    No acute or major events overnight.    PAST MEDICAL & SURGICAL HISTORY      SOCIAL HISTORY:  Social History:      ALLERGIES:  fish (Short breath; Hives)  No Known Drug Allergies      MEDICATIONS:  STANDING MEDICATIONS  aspirin enteric coated 81 milliGRAM(s) Oral daily  atorvastatin 40 milliGRAM(s) Oral at bedtime  bictegravir 50 mG/emtricitabine 200 mG/tenofovir alafenamide 25 mG (BIKTARVY) 1 Tablet(s) Oral daily  chlorhexidine 2% Cloths 1 Application(s) Topical <User Schedule>  dextrose 5%. 1000 milliLiter(s) IV Continuous <Continuous>  dextrose 5%. 1000 milliLiter(s) IV Continuous <Continuous>  dextrose 50% Injectable 25 Gram(s) IV Push once  dextrose 50% Injectable 12.5 Gram(s) IV Push once  dextrose 50% Injectable 25 Gram(s) IV Push once  enoxaparin Injectable 70 milliGRAM(s) SubCutaneous every 12 hours  glucagon  Injectable 1 milliGRAM(s) IntraMuscular once  insulin glargine Injectable (LANTUS) 16 Unit(s) SubCutaneous at bedtime  insulin lispro (ADMELOG) corrective regimen sliding scale   SubCutaneous three times a day before meals  insulin lispro Injectable (ADMELOG) 5 Unit(s) SubCutaneous three times a day before meals  losartan 100 milliGRAM(s) Oral daily  QUEtiapine 200 milliGRAM(s) Oral at bedtime  zolpidem 5 milliGRAM(s) Oral at bedtime    PRN MEDICATIONS  dextrose Oral Gel 15 Gram(s) Oral once PRN      VITALS:   T(C): 36.4 (05-01-25 @ 05:00), Max: 37 (04-30-25 @ 11:00)  HR: 72 (05-01-25 @ 05:00) (72 - 77)  BP: 118/65 (05-01-25 @ 05:00) (104/57 - 141/83)  RR: 19 (05-01-25 @ 05:00) (19 - 20)  SpO2: 97% (05-01-25 @ 05:00) (97% - 99%)  I&O's Summary        PHYSICAL EXAM:    CONSTITUTIONAL: NAD  ENT: Airway patent,   EYES: pupils equal,  round and reactive to light.  CARDIAC: Normal rate, Regular rhythm.  Heart sounds S1, S2.   RESPIRATORY: No wheezing Normal chest expansionNo use of accessory muscles  GASTROINTESTINAL:Abdomen soft Non-tender, No guarding, + BS  MUSCULOSKELETAL: No clubbing, cyanosis  NEUROLOGICAL: Alert and oriented         LABS:                        11.0   5.29  )-----------( 235      ( 30 Apr 2025 05:40 )             32.1     04-30    142  |  110  |  11  ----------------------------<  114[H]  3.9   |  23  |  0.8    Ca    9.5      30 Apr 2025 05:40        Urinalysis Basic - ( 30 Apr 2025 05:40 )    Color: x / Appearance: x / SG: x / pH: x  Gluc: 114 mg/dL / Ketone: x  / Bili: x / Urobili: x   Blood: x / Protein: x / Nitrite: x   Leuk Esterase: x / RBC: x / WBC x   Sq Epi: x / Non Sq Epi: x / Bacteria: x                     24H events:    Today is hospital day 3d. This morning patient was seen and examined at bedside, resting comfortably in bed.    No acute or major events overnight.  Patient refused to answer questions.     PAST MEDICAL & SURGICAL HISTORY      SOCIAL HISTORY:  Social History:      ALLERGIES:  fish (Short breath; Hives)  No Known Drug Allergies      MEDICATIONS:  STANDING MEDICATIONS  aspirin enteric coated 81 milliGRAM(s) Oral daily  atorvastatin 40 milliGRAM(s) Oral at bedtime  bictegravir 50 mG/emtricitabine 200 mG/tenofovir alafenamide 25 mG (BIKTARVY) 1 Tablet(s) Oral daily  chlorhexidine 2% Cloths 1 Application(s) Topical <User Schedule>  dextrose 5%. 1000 milliLiter(s) IV Continuous <Continuous>  dextrose 5%. 1000 milliLiter(s) IV Continuous <Continuous>  dextrose 50% Injectable 25 Gram(s) IV Push once  dextrose 50% Injectable 12.5 Gram(s) IV Push once  dextrose 50% Injectable 25 Gram(s) IV Push once  enoxaparin Injectable 70 milliGRAM(s) SubCutaneous every 12 hours  glucagon  Injectable 1 milliGRAM(s) IntraMuscular once  insulin glargine Injectable (LANTUS) 16 Unit(s) SubCutaneous at bedtime  insulin lispro (ADMELOG) corrective regimen sliding scale   SubCutaneous three times a day before meals  insulin lispro Injectable (ADMELOG) 5 Unit(s) SubCutaneous three times a day before meals  losartan 100 milliGRAM(s) Oral daily  QUEtiapine 200 milliGRAM(s) Oral at bedtime  zolpidem 5 milliGRAM(s) Oral at bedtime    PRN MEDICATIONS  dextrose Oral Gel 15 Gram(s) Oral once PRN      VITALS:   T(C): 36.4 (05-01-25 @ 05:00), Max: 37 (04-30-25 @ 11:00)  HR: 72 (05-01-25 @ 05:00) (72 - 77)  BP: 118/65 (05-01-25 @ 05:00) (104/57 - 141/83)  RR: 19 (05-01-25 @ 05:00) (19 - 20)  SpO2: 97% (05-01-25 @ 05:00) (97% - 99%)  I&O's Summary        PHYSICAL EXAM:  could not be done , pt not cooperating         LABS:                        11.0   5.29  )-----------( 235      ( 30 Apr 2025 05:40 )             32.1     04-30    142  |  110  |  11  ----------------------------<  114[H]  3.9   |  23  |  0.8    Ca    9.5      30 Apr 2025 05:40        Urinalysis Basic - ( 30 Apr 2025 05:40 )    Color: x / Appearance: x / SG: x / pH: x  Gluc: 114 mg/dL / Ketone: x  / Bili: x / Urobili: x   Blood: x / Protein: x / Nitrite: x   Leuk Esterase: x / RBC: x / WBC x   Sq Epi: x / Non Sq Epi: x / Bacteria: x                     24H events:    Today is hospital day 3d. This morning patient was seen and examined at bedside, resting comfortably in bed.    No acute or major events overnight.  Patient refused to answer questions.       PAST MEDICAL & SURGICAL HISTORY      SOCIAL HISTORY:  Social History:      ALLERGIES:  fish (Short breath; Hives)  No Known Drug Allergies      MEDICATIONS:  STANDING MEDICATIONS  aspirin enteric coated 81 milliGRAM(s) Oral daily  atorvastatin 40 milliGRAM(s) Oral at bedtime  bictegravir 50 mG/emtricitabine 200 mG/tenofovir alafenamide 25 mG (BIKTARVY) 1 Tablet(s) Oral daily  chlorhexidine 2% Cloths 1 Application(s) Topical <User Schedule>  dextrose 5%. 1000 milliLiter(s) IV Continuous <Continuous>  dextrose 5%. 1000 milliLiter(s) IV Continuous <Continuous>  dextrose 50% Injectable 25 Gram(s) IV Push once  dextrose 50% Injectable 12.5 Gram(s) IV Push once  dextrose 50% Injectable 25 Gram(s) IV Push once  enoxaparin Injectable 70 milliGRAM(s) SubCutaneous every 12 hours  glucagon  Injectable 1 milliGRAM(s) IntraMuscular once  insulin glargine Injectable (LANTUS) 16 Unit(s) SubCutaneous at bedtime  insulin lispro (ADMELOG) corrective regimen sliding scale   SubCutaneous three times a day before meals  insulin lispro Injectable (ADMELOG) 5 Unit(s) SubCutaneous three times a day before meals  losartan 100 milliGRAM(s) Oral daily  QUEtiapine 200 milliGRAM(s) Oral at bedtime  zolpidem 5 milliGRAM(s) Oral at bedtime    PRN MEDICATIONS  dextrose Oral Gel 15 Gram(s) Oral once PRN      VITALS:   T(C): 36.4 (05-01-25 @ 05:00), Max: 37 (04-30-25 @ 11:00)  HR: 72 (05-01-25 @ 05:00) (72 - 77)  BP: 118/65 (05-01-25 @ 05:00) (104/57 - 141/83)  RR: 19 (05-01-25 @ 05:00) (19 - 20)  SpO2: 97% (05-01-25 @ 05:00) (97% - 99%)  I&O's Summary        PHYSICAL EXAM:  could not be done , pt not cooperating         LABS:                        11.0   5.29  )-----------( 235      ( 30 Apr 2025 05:40 )             32.1     04-30    142  |  110  |  11  ----------------------------<  114[H]  3.9   |  23  |  0.8    Ca    9.5      30 Apr 2025 05:40        Urinalysis Basic - ( 30 Apr 2025 05:40 )    Color: x / Appearance: x / SG: x / pH: x  Gluc: 114 mg/dL / Ketone: x  / Bili: x / Urobili: x   Blood: x / Protein: x / Nitrite: x   Leuk Esterase: x / RBC: x / WBC x   Sq Epi: x / Non Sq Epi: x / Bacteria: x

## 2025-05-01 NOTE — DISCHARGE NOTE PROVIDER - CARE PROVIDERS DIRECT ADDRESSES
,[email or web address],DirectAddress_Unknown

## 2025-05-01 NOTE — PHYSICAL THERAPY INITIAL EVALUATION ADULT - PERTINENT HX OF CURRENT PROBLEM, REHAB EVAL
Patient is a 70 year old F with PMHx CVA (residual mild expressive aphasia, and dysarthria), HTN, HLD, Type II DM, and HIV on Biktarvy, who presented to the ED with her daughter-in-law from an assisted living facility (?) after she was thought to have a syncopal episode vs. seizure. As per ED note, patient's daughter in law reported patient appeared to be talking when she began shaking; she was also very diaphoretic, no clear post ictal state. As per ED note, patient denied any fevers, chills, current chest pain, shortness of breath, or palpitations. Seen by neuro-- history obtained makes seizure unlikely. However given her hx of HIV and stroke, seizures can't be completely ruled out. Recommended CT Head non con. routine labs, EEG, and starting ASA given hx of stroke.

## 2025-05-01 NOTE — DISCHARGE NOTE PROVIDER - CARE PROVIDER_API CALL
LAI SIMMS  [address] [place] [address]  Phone: [phone]  Fax: [phone]  Follow Up Time: 1 week    Hoang Guadalupe  Interventional Cardiology  [address], [address]  [place] [address]  Phone: [phone]  Fax: [phone]  Follow Up Time: 1 week    Devon Braun)  Hematology  [address] [place] [address]  Phone: [phone]  Fax: [phone]  Follow Up Time: 1 week    [facility]  Neurology  [address], [address]  [place] [address]  Phone: [phone]  Fax: [phone]  Follow Up Time: 1 week    Ivonne Will  Endocrinology/Metab/Diabetes  [address]  [place] [address]  Phone: [phone]  Fax: [phone]  Follow Up Time: 1 week

## 2025-05-01 NOTE — DISCHARGE NOTE PROVIDER - NSFOLLOWUPCLINICS_GEN_ALL_ED_FT
[facility]  Infectious Diseases  [address] [place] [address]  Phone: [phone]  Fax: [phone]  Follow Up Time: 1 week

## 2025-05-01 NOTE — DISCHARGE NOTE PROVIDER - HOSPITAL COURSE
70 year old F with PMHx CVA (residual mild expressive aphasia, and dysarthria), HTN, HLD, Type II DM, and HIV on Biktarvy, who presented to the ED with her daughter-in-law from an assisted living facility after she was thought to have a syncopal episode vs. seizure. Found to have right lobar/segmental PE with right heart strain, admitted to SDU. Has remained HD stable, off pressors     Right lobar/segmental pulmonary emboli with Right heart strain  - presented after syncopal episode vs seizure  - Trop 15 --> 13, Pro-BNP <36, BP borderline   - CT Angio Chest w/ PE Protocol: Right lobar/segmental pulmonary emboli. Right heart strain is noted.  - Echo reviewed  - LE duplex with R peroneal DVT  - c/w Lovenox 70 mg BID, eliquis to pharmacy for pricing  - will need OP heme onc eval and age appropriate screening     Syncopal Episode vs Seizure   - orthostats negative, CTH no acute process  - fu EEG, patient refused  - ASA given hx of stroke  - PT pending     HTN  - from [facility] HIE:  - was recently seen by Dr. Guadalupe at [facility] who determined losartan should be decreased to 50 mg BID (was 100 mg BID)  - was found to have hyperdynamic left ventricle, for which Dr. Guadalupe recommended metoprolol succinate 25 mg once a day in the morning   - holding all BP meds for now in setting of hypotension, resume as needed    Type II DM - uncontrolled   - a1c11.5   - monitor glucose, ISS for now     HLD  - c/w atorvastatin 40 mg qhs    H/O Cardiac Surgery with midline sternotomy scar     HIV on Biktarvy  - unknown if patient follows with ID, c/w Biktarvy.  -ID eval    70 year old F with PMHx CVA (residual mild expressive aphasia, and dysarthria), HTN, HLD, Type II DM, and HIV on Biktarvy, who presented to the ED with her daughter-in-law from an assisted living facility after she was thought to have a syncopal episode vs. seizure. Found to have right lobar/segmental PE with right heart strain, admitted to SDU. Has remained HD stable, off pressors       Acute right segmental/subsegmental PE w no right heart strain  Right peroneal vein DVT  DM / HTN  Hx of CVA w residual mild expressive aphasia, and dysarthria  HIV     refused REEG, but picture debbie from PE  stable for DC home  Eliquis 10mg bid for 7 days then 5mg bid, explained to pt need to see her PCP for eliquis refill and  age appropriate cancer screening, pt agreeable and understood the plan.    DC to home once family member notified .

## 2025-05-01 NOTE — PROGRESS NOTE ADULT - ATTENDING COMMENTS
70 year old F with PMHx CVA (residual mild expressive aphasia, and dysarthria), HTN, HLD, Type II DM, and HIV on Biktarvy, who presented to the ED with her daughter-in-law from an assisted living facility after she was thought to have a syncopal episode vs. seizure.    feels fine, wants to go home, pt has expressive aphasia but able to answer my questions, shes aaox4, but has struggle remembering certain thing s for ex doesn't know whts her home address or her pharmacy address,    CONSTITUTIONAL: No acute distress,   HEAD: Atraumatic, normocephalic  EYES: EOM intact, PERRLA, conjunctiva and sclera clear  ENT: Supple, no masses, no thyromegaly, no bruits, no JVD; moist mucous membranes  PULMONARY: Clear to auscultation bilaterally; no wheezes, rales, or rhonchi  CARDIOVASCULAR: Regular rate and rhythm; no murmurs, rubs, or gallops  GASTROINTESTINAL: Soft, non-tender, non-distended; bowel sounds present  MUSCULOSKELETAL: 2+ peripheral pulses; no clubbing, no cyanosis, no edema  NEUROLOGY: aao x3, moves all exts, + mild expressive aphasia and dysarthria, non-focal  SKIN: No rashes or lesions; warm and dry    Acute right segmental/subsegmental PE w no right heart strain  Right peroneal vein DVT  DM / HTN  Hx of CVA w residual mild expressive aphasia, and dysarthria      refused REEG, aidan lewis from PE  stable for DC home  Eliquis 10mg bid for 7 days then 5mg bid, explained to pt need to see her PCP for eliquis refill and  age appropriate cancer screening, pt agreeable and understood the plan.    DC to home once family member notified 70 year old F with PMHx CVA (residual mild expressive aphasia, and dysarthria), HTN, HLD, Type II DM, and HIV on Biktarvy, who presented to the ED with her daughter-in-law from an assisted living facility after she was thought to have a syncopal episode vs. seizure.    feels fine, wants to go home, pt has expressive aphasia but able to answer my questions, shes aaox3, but has struggle remembering certain thing s for ex doesn't know whts her home address or her pharmacy address,    CONSTITUTIONAL: No acute distress,   HEAD: Atraumatic, normocephalic  EYES: EOM intact, PERRLA, conjunctiva and sclera clear  ENT: Supple, no masses, no thyromegaly, no bruits, no JVD; moist mucous membranes  PULMONARY: Clear to auscultation bilaterally; no wheezes, rales, or rhonchi  CARDIOVASCULAR: Regular rate and rhythm; no murmurs, rubs, or gallops  GASTROINTESTINAL: Soft, non-tender, non-distended; bowel sounds present  MUSCULOSKELETAL: 2+ peripheral pulses; no clubbing, no cyanosis, no edema  NEUROLOGY: aao x3, moves all exts, + mild expressive aphasia and dysarthria, non-focal  SKIN: No rashes or lesions; warm and dry    Acute right segmental/subsegmental PE w no right heart strain  Right peroneal vein DVT  DM / HTN  Hx of CVA w residual mild expressive aphasia, and dysarthria      refused REEG, aidan lewis from PE  stable for DC home  Eliquis 10mg bid for 7 days then 5mg bid, explained to pt need to see her PCP for eliquis refill and  age appropriate cancer screening, pt agreeable and understood the plan.    DC to home once family member notified

## 2025-05-01 NOTE — DISCHARGE NOTE PROVIDER - PROVIDER RX CONTACT NUMBER
Patient states she is feeling Abdomen pain with feeling full and wanting to vomiting. Please call. Thank you. [phone]

## 2025-05-01 NOTE — DISCHARGE NOTE PROVIDER - PROVIDER TOKENS
PROVIDER:[TOKEN:[[id number]],FOLLOWUP:[1 week]],PROVIDER:[TOKEN:[[id number]],FOLLOWUP:[1 week]],PROVIDER:[TOKEN:[[id number]],FOLLOWUP:[1 week]],PROVIDER:[TOKEN:[[id number]],FOLLOWUP:[1 week]],PROVIDER:[TOKEN:[[id number]],FOLLOWUP:[1 week]]

## 2025-05-01 NOTE — DISCHARGE NOTE PROVIDER - ATTENDING DISCHARGE PHYSICAL EXAMINATION:
CONSTITUTIONAL: No acute distress,   HEAD: Atraumatic, normocephalic  EYES: EOM intact, PERRLA, conjunctiva and sclera clear  ENT: Supple, no masses, no thyromegaly, no bruits, no JVD; moist mucous membranes  PULMONARY: Clear to auscultation bilaterally; no wheezes, rales, or rhonchi  CARDIOVASCULAR: Regular rate and rhythm; no murmurs, rubs, or gallops  GASTROINTESTINAL: Soft, non-tender, non-distended; bowel sounds present  MUSCULOSKELETAL: 2+ peripheral pulses; no clubbing, no cyanosis, no edema  NEUROLOGY: aao x3, moves all exts, + mild expressive aphasia and dysarthria, non-focal  SKIN: No rashes or lesions; warm and dry

## 2025-05-01 NOTE — PROGRESS NOTE ADULT - ASSESSMENT
70 year old F with PMHx CVA (residual mild expressive aphasia, and dysarthria), HTN, HLD, Type II DM, and HIV on Biktarvy, who presented to the ED with her daughter-in-law from an assisted living facility after she was thought to have a syncopal episode vs. seizure. Found to have right lobar/segmental PE with right heart strain, admitted to SDU. Has remained HD stable, off pressors     Right lobar/segmental pulmonary emboli with Right heart strain  - presented after syncopal episode vs seizure  - Trop 15 --> 13, Pro-BNP <36, BP borderline   - CT Angio Chest w/ PE Protocol: Right lobar/segmental pulmonary emboli. Right heart strain is noted.  - Echo reviewed  - LE duplex with R peroneal DVT  - c/w Lovenox 70 mg BID, eliquis to pharmacy for pricing  - will need OP heme onc eval and age appropriate screening     Syncopal Episode vs Seizure   - orthostats negative, CTH no acute process  - fu EEG, patient refused  - ASA given hx of stroke  - PT pending     HTN  - from [facility] HIE:  - was recently seen by Dr. Guadalupe at [facility] who determined losartan should be decreased to 50 mg BID (was 100 mg BID)  - was found to have hyperdynamic left ventricle, for which Dr. Guadalupe recommended metoprolol succinate 25 mg once a day in the morning   - holding all BP meds for now in setting of hypotension, resume as needed    Type II DM - uncontrolled   - a1c11.5   - monitor glucose, ISS for now     HLD  - c/w atorvastatin 40 mg qhs    H/O Cardiac Surgery with midline sternotomy scar     HIV on Biktarvy  - unknown if patient follows with ID, c/w Biktarvy.  -ID eval     Full code

## 2025-05-01 NOTE — DISCHARGE NOTE NURSING/CASE MANAGEMENT/SOCIAL WORK - FINANCIAL ASSISTANCE
[facility] provides services at a reduced cost to those who are determined to be eligible through [facility]’s financial assistance program. Information regarding [facility]’s financial assistance program can be found by going to [email or web address] or by calling [phone].

## 2025-05-01 NOTE — PHYSICAL THERAPY INITIAL EVALUATION ADULT - NSACTIVITYREC_GEN_A_PT
Reviewed ther ex's for B LE to perform throughout day in sitting: hip flex, knee flex/ext, ankle pumps, 10 reps each. Recommend pt amb on unit with unit staff.

## 2025-05-02 ENCOUNTER — NON-APPOINTMENT (OUTPATIENT)
Age: 70
End: 2025-05-02

## 2025-05-07 DIAGNOSIS — I69.922 DYSARTHRIA FOLLOWING UNSPECIFIED CEREBROVASCULAR DISEASE: ICD-10-CM

## 2025-05-07 DIAGNOSIS — B20 HUMAN IMMUNODEFICIENCY VIRUS [HIV] DISEASE: ICD-10-CM

## 2025-05-07 DIAGNOSIS — I82.451 ACUTE EMBOLISM AND THROMBOSIS OF RIGHT PERONEAL VEIN: ICD-10-CM

## 2025-05-07 DIAGNOSIS — I26.99 OTHER PULMONARY EMBOLISM WITHOUT ACUTE COR PULMONALE: ICD-10-CM

## 2025-05-07 DIAGNOSIS — I69.920 APHASIA FOLLOWING UNSPECIFIED CEREBROVASCULAR DISEASE: ICD-10-CM

## 2025-05-07 DIAGNOSIS — I26.93 SINGLE SUBSEGMENTAL THROMBOTIC PULMONARY EMBOLISM WITHOUT ACUTE COR PULMONALE: ICD-10-CM

## 2025-05-07 DIAGNOSIS — Z79.899 OTHER LONG TERM (CURRENT) DRUG THERAPY: ICD-10-CM

## 2025-05-07 DIAGNOSIS — E11.9 TYPE 2 DIABETES MELLITUS WITHOUT COMPLICATIONS: ICD-10-CM

## 2025-05-07 DIAGNOSIS — E78.5 HYPERLIPIDEMIA, UNSPECIFIED: ICD-10-CM

## 2025-05-07 DIAGNOSIS — Z79.01 LONG TERM (CURRENT) USE OF ANTICOAGULANTS: ICD-10-CM

## 2025-05-07 DIAGNOSIS — I10 ESSENTIAL (PRIMARY) HYPERTENSION: ICD-10-CM
